# Patient Record
Sex: FEMALE | Race: WHITE | NOT HISPANIC OR LATINO | Employment: FULL TIME | URBAN - METROPOLITAN AREA
[De-identification: names, ages, dates, MRNs, and addresses within clinical notes are randomized per-mention and may not be internally consistent; named-entity substitution may affect disease eponyms.]

---

## 2020-01-20 ENCOUNTER — OFFICE VISIT (OUTPATIENT)
Dept: FAMILY MEDICINE CLINIC | Facility: CLINIC | Age: 40
End: 2020-01-20

## 2020-01-20 VITALS
HEIGHT: 61 IN | HEART RATE: 60 BPM | TEMPERATURE: 96.6 F | DIASTOLIC BLOOD PRESSURE: 52 MMHG | BODY MASS INDEX: 24.92 KG/M2 | WEIGHT: 132 LBS | SYSTOLIC BLOOD PRESSURE: 98 MMHG

## 2020-01-20 DIAGNOSIS — J01.00 ACUTE NON-RECURRENT MAXILLARY SINUSITIS: ICD-10-CM

## 2020-01-20 DIAGNOSIS — F90.2 ATTENTION DEFICIT HYPERACTIVITY DISORDER (ADHD), COMBINED TYPE: ICD-10-CM

## 2020-01-20 DIAGNOSIS — J30.1 NON-SEASONAL ALLERGIC RHINITIS DUE TO POLLEN: ICD-10-CM

## 2020-01-20 DIAGNOSIS — F41.9 ANXIETY: ICD-10-CM

## 2020-01-20 DIAGNOSIS — F42.2 MIXED OBSESSIONAL THOUGHTS AND ACTS: Primary | ICD-10-CM

## 2020-01-20 PROCEDURE — 99214 OFFICE O/P EST MOD 30 MIN: CPT | Performed by: FAMILY MEDICINE

## 2020-01-20 RX ORDER — NORETHINDRONE AND ETHINYL ESTRADIOL 1 MG-35MCG
1 KIT ORAL DAILY
COMMUNITY
Start: 2020-01-06 | End: 2022-11-30

## 2020-01-20 RX ORDER — AMOXICILLIN 875 MG/1
875 TABLET, COATED ORAL 2 TIMES DAILY
Qty: 20 TABLET | Refills: 0 | Status: SHIPPED | OUTPATIENT
Start: 2020-01-20 | End: 2021-05-03

## 2020-01-20 RX ORDER — DEXTROAMPHETAMINE SACCHARATE, AMPHETAMINE ASPARTATE MONOHYDRATE, DEXTROAMPHETAMINE SULFATE AND AMPHETAMINE SULFATE 5; 5; 5; 5 MG/1; MG/1; MG/1; MG/1
1 CAPSULE, EXTENDED RELEASE ORAL 2 TIMES DAILY
COMMUNITY
Start: 2019-12-19 | End: 2020-01-20

## 2020-01-20 RX ORDER — FLUOXETINE HYDROCHLORIDE 20 MG/1
20 CAPSULE ORAL DAILY
Qty: 30 CAPSULE | Refills: 5 | Status: SHIPPED | OUTPATIENT
Start: 2020-01-20 | End: 2020-02-17 | Stop reason: SDUPTHER

## 2020-01-20 NOTE — PROGRESS NOTES
Subjective   Nely Gonzales is a 39 y.o. female.     Vitals:    01/20/20 0954   BP: 98/52   Pulse: 60   Temp: 96.6 °F (35.9 °C)        Chief Complaint   Patient presents with   • Anxiety   • Dizziness     possible sinus   • ADHD     possible change in meds ? back to vyvanse        History of Present Illness    The following portions of the patient's history were reviewed and updated as appropriate: allergies, current medications, past family history, past medical history, past social history, past surgical history and problem list.    3-month follow-up and establish care with me here at [facility], operated by [facility]  Office visit 10/2019 with Sunday Pastor at [facility].  At that time patient's Adderall was changed back to Vyvanse due to her concern about increased OCD behavior with pulling at her hair.  Unfortunately this did not improve and now she is grinding her teeth excessively.  On a good note the obsessive behavior of pulling at her hair has improved since she has a behavioral modification of a bracelet that she wears on her wrist that vibrates when she starts to do that behavior.    She also complains of dizziness x10 days.  Despite changing her Allegra to Allegra-D this is not improving.  Of note she is getting her house remodeled, so there is a lot of dust.  Denies fever, nausea, vomiting    Review of Systems   Constitutional: Negative for fever, unexpected weight gain and unexpected weight loss.   HENT: Positive for congestion.    Respiratory: Negative for cough and chest tightness.    Cardiovascular: Negative for chest pain.   Neurological: Positive for dizziness.       Objective   Physical Exam   Constitutional: She appears well-developed.   HENT:   Right Ear: External ear normal.   Left Ear: External ear normal.   Oropharynx pharynx with drainage   Neck: Normal range of motion. Neck supple. No thyromegaly present.   Cardiovascular: Normal rate, regular rhythm and normal heart sounds.   Pulmonary/Chest: Effort normal and breath  sounds normal.   Lymphadenopathy:     She has no cervical adenopathy.   Psychiatric: She has a normal mood and affect. Her behavior is normal. Judgment and thought content normal.   Nursing note and vitals reviewed.       LABS/STUDIES   -----urine tox done 3/2019 and a CBC, CMP, TSH, lipids done as well and within normal limits      Assessment/Plan   Nely was seen today for anxiety, dizziness and adhd.    Diagnoses and all orders for this visit:    Mixed obsessional thoughts and acts/OCD --- uncontrolled, will DC Lexapro and start Prozac 20 mg 1 p.o. Daily.  Patient is to also continue with her behavioral controlled therapy    Anxiety    Attention deficit hyperactivity disorder (ADHD), combined type  --- DC Adderall, start Vyvanse 20 mg p.o. twice daily.  Note this was her previous dose and tolerated twice daily.  -     lisdexamfetamine (VYVANSE) 20 MG capsule; Take 1 capsule by mouth 2 (Two) Times a Day    Acute non-recurrent maxillary sinusitis  --- Amoxil 875 mg twice daily x10 days    Non-seasonal allergic rhinitis due to pollen  --   no change with Allegra, may use Allegra-D for the next 10 days and then back to her regular Allegra    Other orders  -     FLUoxetine (PROZAC) 20 MG capsule; Take 1 capsule by mouth Daily.  -     amoxicillin (AMOXIL) 875 MG tablet; Take 1 tablet by mouth 2 (Two) Times a Day.                   Return in about 3 months (around 4/20/2020).

## 2020-02-11 RX ORDER — ESCITALOPRAM OXALATE 10 MG/1
10 TABLET ORAL DAILY
COMMUNITY
Start: 2020-01-27 | End: 2020-03-23 | Stop reason: SDUPTHER

## 2020-02-17 RX ORDER — FLUOXETINE HYDROCHLORIDE 20 MG/1
20 CAPSULE ORAL DAILY
Qty: 90 CAPSULE | Refills: 0 | Status: SHIPPED | OUTPATIENT
Start: 2020-02-17 | End: 2020-03-23

## 2020-02-20 DIAGNOSIS — F90.2 ATTENTION DEFICIT HYPERACTIVITY DISORDER (ADHD), COMBINED TYPE: ICD-10-CM

## 2020-02-22 NOTE — TELEPHONE ENCOUNTER
Let patient know that I did make her requested change . For the future, it may take every bit of 3 days to get controlled meds refilled ....will discuss more at her appt

## 2020-03-22 DIAGNOSIS — F90.2 ATTENTION DEFICIT HYPERACTIVITY DISORDER (ADHD), COMBINED TYPE: ICD-10-CM

## 2020-03-23 DIAGNOSIS — F90.2 ATTENTION DEFICIT HYPERACTIVITY DISORDER (ADHD), COMBINED TYPE: ICD-10-CM

## 2020-03-23 RX ORDER — ESCITALOPRAM OXALATE 10 MG/1
10 TABLET ORAL DAILY
Qty: 90 TABLET | Refills: 1 | Status: SHIPPED | OUTPATIENT
Start: 2020-03-23 | End: 2020-04-24 | Stop reason: SDUPTHER

## 2020-04-24 ENCOUNTER — TELEMEDICINE (OUTPATIENT)
Dept: FAMILY MEDICINE CLINIC | Facility: CLINIC | Age: 40
End: 2020-04-24

## 2020-04-24 VITALS — BODY MASS INDEX: 24.92 KG/M2 | HEIGHT: 61 IN | WEIGHT: 132 LBS

## 2020-04-24 DIAGNOSIS — F41.9 ANXIETY: ICD-10-CM

## 2020-04-24 DIAGNOSIS — F90.2 ATTENTION DEFICIT HYPERACTIVITY DISORDER (ADHD), COMBINED TYPE: ICD-10-CM

## 2020-04-24 DIAGNOSIS — F42.2 MIXED OBSESSIONAL THOUGHTS AND ACTS: Primary | ICD-10-CM

## 2020-04-24 PROCEDURE — 99214 OFFICE O/P EST MOD 30 MIN: CPT | Performed by: FAMILY MEDICINE

## 2020-04-24 RX ORDER — ESCITALOPRAM OXALATE 10 MG/1
20 TABLET ORAL DAILY
Qty: 90 TABLET | Refills: 1 | Status: SHIPPED | OUTPATIENT
Start: 2020-04-24 | End: 2020-04-28

## 2020-04-24 NOTE — PATIENT INSTRUCTIONS
Increase Lexapro to 20 mg a day otherwise no changes  Recommend low fat/low calorie diet and exercise greater than 150 minutes of cardio per week.   Continue current treatment plan.

## 2020-04-24 NOTE — PROGRESS NOTES
Subjective   Nely Gonzales is a 39 y.o. female.     There were no vitals filed for this visit.     Chief Complaint   Patient presents with   • mixed obsessional thoughts and acts     Patient is f/u on medication         History of Present Illness    This 3-month follow-up was converted to a video visit in accordance with [facility] recommendations and guidelines given the current coronavirus pandemic    3-month follow-up on ADHD, anxiety, OCD behaviors    Patient's ADHD has been well controlled with Vyvanse 30 mg twice daily.  Gopi reviewed shows this was last filled March 23, 2020 for quantity of 60.  No aberrant behaviors.  Needs refill today    Patient's chronic anxiety has been in fair control with Lexapro 10 mg daily even despite current pandemic.  However, her OCD behavior with picking and pulling at her hair unfortunately has not resolved.  Last office visit we tried changing the Lexapro to Prozac.  Yet, unfortunately the Prozac 20 mg made things worse, such as irritability, insomnia.  Thus, Nely went ahead and stopped the Prozac and changed back to her Lexapro 10 mg.  The plan was to start some behavioral cognitive therapy as well.  But, this did not happen due to the current global pandemic.  She does try to limit the opportunities for this behavior by keeping her hair in a bun and pulled up at all times.  She currently is working at home.  Her father does help her with her son/Blaise who has autism.      The following portions of the patient's history were reviewed and updated as appropriate: allergies, current medications, past family history, past medical history, past social history, past surgical history and problem list.    Review of Systems   Constitutional: Negative for fatigue, unexpected weight gain and unexpected weight loss.   Cardiovascular: Negative for chest pain and palpitations.   Psychiatric/Behavioral: Positive for behavioral problems and stress. Negative for sleep disturbance. The  patient is nervous/anxious.        Objective   Physical Exam   Constitutional: She is oriented to person, place, and time. She appears well-developed. No distress.   HENT:   Head: Normocephalic and atraumatic.   Pulmonary/Chest: Effort normal.   Neurological: She is alert and oriented to person, place, and time.   Psychiatric: She has a normal mood and affect. Her behavior is normal. Judgment and thought content normal.   Nursing note and vitals reviewed.       LABS/STUDIES --last labs approximately summer 2019 within normal limits    Procedures     Assessment/Plan   Nely was seen today for mixed obsessional thoughts and acts.    Diagnoses and all orders for this visit:    Mixed obsessional thoughts and acts --uncontrolled.  At this time will try to increase Lexapro from 10 mg to 20 mg 1 p.o. daily.  Have discussed with patient other options such as the possibility of clonidine which may be used for tic disorder.    Attention deficit hyperactivity disorder (ADHD), combined type --stable.  Gopi, contract, tox all up-to-date.  Will need to update urine tox for compliance at next office visit.  Will refill Vyvanse 30 mg 1 p.o. twice daily.  -     lisdexamfetamine (VYVANSE) 30 MG capsule; Take 1 capsule by mouth 2 (Two) Times a Day    Anxiety --uncontrolled.  Will increase Lexapro to 20 mg 1 p.o. daily.  If this is ineffective may consider adding BuSpar?    Other orders  -     escitalopram (LEXAPRO) 10 MG tablet; Take 2 tablets by mouth Daily.      Video visit time 25 minutes           Return in about 3 months (around 7/24/2020) for Annual physical.     This was an audio and video enabled telemedicine encounter.

## 2020-04-28 RX ORDER — ESCITALOPRAM OXALATE 20 MG/1
20 TABLET ORAL DAILY
Qty: 90 TABLET | Refills: 3 | Status: SHIPPED | OUTPATIENT
Start: 2020-04-28 | End: 2020-11-11

## 2020-06-24 DIAGNOSIS — F90.2 ATTENTION DEFICIT HYPERACTIVITY DISORDER (ADHD), COMBINED TYPE: ICD-10-CM

## 2020-07-10 ENCOUNTER — OFFICE VISIT (OUTPATIENT)
Dept: FAMILY MEDICINE CLINIC | Facility: CLINIC | Age: 40
End: 2020-07-10

## 2020-07-10 VITALS
HEART RATE: 50 BPM | TEMPERATURE: 98.3 F | BODY MASS INDEX: 26.47 KG/M2 | OXYGEN SATURATION: 98 % | SYSTOLIC BLOOD PRESSURE: 80 MMHG | WEIGHT: 140.2 LBS | HEIGHT: 61 IN | DIASTOLIC BLOOD PRESSURE: 30 MMHG

## 2020-07-10 DIAGNOSIS — F90.2 ATTENTION DEFICIT HYPERACTIVITY DISORDER (ADHD), COMBINED TYPE: ICD-10-CM

## 2020-07-10 DIAGNOSIS — F41.9 ANXIETY: ICD-10-CM

## 2020-07-10 DIAGNOSIS — Z00.00 WELLNESS EXAMINATION: Primary | ICD-10-CM

## 2020-07-10 DIAGNOSIS — R00.1 BRADYCARDIA: ICD-10-CM

## 2020-07-10 DIAGNOSIS — H81.10 BENIGN PAROXYSMAL POSITIONAL VERTIGO, UNSPECIFIED LATERALITY: ICD-10-CM

## 2020-07-10 DIAGNOSIS — I49.3 PVC (PREMATURE VENTRICULAR CONTRACTION): ICD-10-CM

## 2020-07-10 DIAGNOSIS — R42 DIZZINESS: ICD-10-CM

## 2020-07-10 PROCEDURE — 99396 PREV VISIT EST AGE 40-64: CPT | Performed by: FAMILY MEDICINE

## 2020-07-10 PROCEDURE — 99214 OFFICE O/P EST MOD 30 MIN: CPT | Performed by: FAMILY MEDICINE

## 2020-07-10 PROCEDURE — 93000 ELECTROCARDIOGRAM COMPLETE: CPT | Performed by: FAMILY MEDICINE

## 2020-07-10 NOTE — PROGRESS NOTES
Subjective   Nely Gonzales is a 40 y.o. female.     Vitals:    07/10/20 0920   BP: (!) 80/30   Pulse: 50   Temp: 98.3 °F (36.8 °C)   SpO2: 98%        Chief Complaint   Patient presents with   • Annual Exam     biometric screening form filled out and fasting labs   • Dizziness   • ADD   • Anxiety        History of Present Illness    Presents for annual wellness/biometric screen and 3-month follow-up for ADD, anxiety, and new complaints of dizziness    Last office visit via telehealth.  Lexapro was increased from 10 mg to 20 mg to try to help with OCD behaviors/pulling at hair.  However, she states this did not help and what has helped her the most with some cognitive behavior training.  Thus, she recently went back to her 10 mg dose of Lexapro.  She is tolerating this without side effects.    Chronic ADHD has been stable with long-term Vyvanse 30 mg twice daily.  She has been on this regimen for many years.  Tolerates medication without side effects.  Gopi reviewed.  Last urine tox screen for compliance March 2019.    Complains of dizziness over the last 3 months.  She states this only occurs when she changes positions, especially rolling over in bed.  Denies chest pain, shortness of air, chest palpitations.  Has always had a low blood pressure, and low resting heart rate.  She believes she was diagnosed with some type of arrhythmia?  Many many years ago.  No recent illness, no fever.  Admits to excessive caffeine intake    Routine health screening/testing:  Has GYN doc, Pap up-to-date, plans to begin mammogram testing this year  Last lipids March 2019, within normal limits  Last glucose March 2019, within normal limits  Last tetanus within 10 years  No hepatitis C antibody screen  Maintains a healthy well-balanced diet and regular cardio exercise  Non-smoker, social alcohol  Family history negative for colon cancer, premature coronary artery disease      The following portions of the patient's history were  reviewed and updated as appropriate: allergies, current medications, past family history, past medical history, past social history, past surgical history and problem list.    Review of Systems   Constitutional: Negative for fatigue, fever, unexpected weight gain and unexpected weight loss.   Respiratory: Negative for shortness of breath.    Cardiovascular: Negative for chest pain, palpitations and leg swelling.   Neurological: Positive for dizziness. Negative for light-headedness and headache.   Psychiatric/Behavioral: Positive for decreased concentration and stress. Negative for self-injury, sleep disturbance, negative for hyperactivity and depressed mood. The patient is not nervous/anxious.        Objective   Physical Exam   Constitutional: She appears well-developed.   HENT:   Head: Normocephalic and atraumatic.   Right Ear: External ear normal.   Left Ear: External ear normal.   Eyes: Pupils are equal, round, and reactive to light. Conjunctivae are normal.   Neck: Normal range of motion. Neck supple. No thyromegaly present.   Cardiovascular: Normal rate and normal heart sounds.   No murmur heard.  Irregular to auscultation, PVC versus PAC?   Pulmonary/Chest: Effort normal and breath sounds normal.   Abdominal: Soft. Bowel sounds are normal. She exhibits no distension. There is no hepatosplenomegaly. There is no tenderness.   Musculoskeletal: She exhibits no edema.   Lymphadenopathy:     She has no cervical adenopathy.   Psychiatric: She has a normal mood and affect. Her behavior is normal. Judgment and thought content normal.   Nursing note and vitals reviewed.       LABS/STUDIES last labs March 2019 all within normal limits      ECG 12 Lead  Date/Time: 7/10/2020 4:59 PM  Performed by: Brynn Dong MD  Authorized by: Brynn Dong MD   Comparison: not compared with previous ECG   Previous ECG: no previous ECG available  Rhythm: sinus arrhythmia  Ectopy: multifocal PVCs and bigeminy  Rate:  normal  Conduction: conduction normal  ST Segments: ST segments normal  T inversion: III  QRS axis: normal  Other: no other findings  Other findings: early repolarization    Clinical impression: non-specific ECG  Comments: PVCs noted, one episode of bigeminy  Asymptomatic  Lots of caffeine  Previous history of PVCs             Assessment/Plan   Nely was seen today for annual exam, dizziness, add and anxiety.    Diagnoses and all orders for this visit:    Wellness examination within normal limits.  Has GYN doctor.  All screening up-to-date except for needs CBC, CMP, hepatitis C antibody screen, and lipid profile.  Otherwise, recommend significant decrease in caffeine intake and continue with healthy well-balanced diet and regular cardio exercise greater than 150 minutes weekly.  Will complete biometric forms once labs are resulted  -     CBC & Differential  -     Comprehensive Metabolic Panel  -     Hepatitis C Antibody  -     Lipid Panel With LDL / HDL Ratio    Dizziness--new diagnosis.  Suspect BPV.  Will check CBC and electrolytes.  -     CBC & Differential    Benign paroxysmal positional vertigo, unspecified laterality new diagnosis/uncontrolled.  Patient wishes to begin vestibular rehab, order placed    PVC (premature ventricular contraction) uncontrolled.  Likely secondary to significant caffeine take.  Have discussed with patient to decrease caffeine intake.  Will check CMP, magnesium, and TSH to rule out other etiologies.  Asymptomatic  -     Comprehensive Metabolic Panel  -     Magnesium  -     TSH    Bradycardia asymptomatic    Anxiety controlled.  No change with Lexapro 10 mg 1 p.o. daily, will refill upon request    Attention deficit hyperactivity disorder (ADHD), combined type stable.  Gopi reviewed.  No change with Vyvanse.  Next refill for Vyvanse 30 mg 1 p.o. twice daily due July 27, 2020.  Will update urine tox screen for compliance next visit                 Return in about 3 months (around  10/10/2020) for Recheck.

## 2020-07-10 NOTE — PATIENT INSTRUCTIONS
Decrease caffeine  Start vestibular rehab  Recommend low fat/low calorie diet and exercise greater than 150 minutes of cardio per week.   Continue current treatment plan.

## 2020-07-11 LAB
ALBUMIN SERPL-MCNC: 4.6 G/DL (ref 3.5–5.2)
ALBUMIN/GLOB SERPL: 2.3 G/DL
ALP SERPL-CCNC: 24 U/L (ref 39–117)
ALT SERPL-CCNC: 16 U/L (ref 1–33)
AST SERPL-CCNC: 18 U/L (ref 1–32)
BASOPHILS # BLD AUTO: 0.03 10*3/MM3 (ref 0–0.2)
BASOPHILS NFR BLD AUTO: 0.5 % (ref 0–1.5)
BILIRUB SERPL-MCNC: 0.4 MG/DL (ref 0–1.2)
BUN SERPL-MCNC: 12 MG/DL (ref 6–20)
BUN/CREAT SERPL: 15 (ref 7–25)
CALCIUM SERPL-MCNC: 8.6 MG/DL (ref 8.6–10.5)
CHLORIDE SERPL-SCNC: 103 MMOL/L (ref 98–107)
CHOLEST SERPL-MCNC: 198 MG/DL (ref 0–200)
CO2 SERPL-SCNC: 25 MMOL/L (ref 22–29)
CREAT SERPL-MCNC: 0.8 MG/DL (ref 0.57–1)
EOSINOPHIL # BLD AUTO: 0.13 10*3/MM3 (ref 0–0.4)
EOSINOPHIL NFR BLD AUTO: 2.1 % (ref 0.3–6.2)
ERYTHROCYTE [DISTWIDTH] IN BLOOD BY AUTOMATED COUNT: 11.3 % (ref 12.3–15.4)
GLOBULIN SER CALC-MCNC: 2 GM/DL
GLUCOSE SERPL-MCNC: 83 MG/DL (ref 65–99)
HCT VFR BLD AUTO: 39.6 % (ref 34–46.6)
HCV AB S/CO SERPL IA: <0.1 S/CO RATIO (ref 0–0.9)
HDLC SERPL-MCNC: 66 MG/DL (ref 40–60)
HGB BLD-MCNC: 13.1 G/DL (ref 12–15.9)
IMM GRANULOCYTES # BLD AUTO: 0.01 10*3/MM3 (ref 0–0.05)
IMM GRANULOCYTES NFR BLD AUTO: 0.2 % (ref 0–0.5)
LDLC SERPL CALC-MCNC: 121 MG/DL (ref 0–100)
LDLC/HDLC SERPL: 1.84 {RATIO}
LYMPHOCYTES # BLD AUTO: 2.08 10*3/MM3 (ref 0.7–3.1)
LYMPHOCYTES NFR BLD AUTO: 33.9 % (ref 19.6–45.3)
MAGNESIUM SERPL-MCNC: 2.1 MG/DL (ref 1.6–2.6)
MCH RBC QN AUTO: 29.8 PG (ref 26.6–33)
MCHC RBC AUTO-ENTMCNC: 33.1 G/DL (ref 31.5–35.7)
MCV RBC AUTO: 90.2 FL (ref 79–97)
MONOCYTES # BLD AUTO: 0.35 10*3/MM3 (ref 0.1–0.9)
MONOCYTES NFR BLD AUTO: 5.7 % (ref 5–12)
NEUTROPHILS # BLD AUTO: 3.53 10*3/MM3 (ref 1.7–7)
NEUTROPHILS NFR BLD AUTO: 57.6 % (ref 42.7–76)
NRBC BLD AUTO-RTO: 0 /100 WBC (ref 0–0.2)
PLATELET # BLD AUTO: 353 10*3/MM3 (ref 140–450)
POTASSIUM SERPL-SCNC: 3.9 MMOL/L (ref 3.5–5.2)
PROT SERPL-MCNC: 6.6 G/DL (ref 6–8.5)
RBC # BLD AUTO: 4.39 10*6/MM3 (ref 3.77–5.28)
SODIUM SERPL-SCNC: 138 MMOL/L (ref 136–145)
TRIGL SERPL-MCNC: 53 MG/DL (ref 0–150)
TSH SERPL DL<=0.005 MIU/L-ACNC: 1.65 UIU/ML (ref 0.27–4.2)
VLDLC SERPL CALC-MCNC: 10.6 MG/DL
WBC # BLD AUTO: 6.13 10*3/MM3 (ref 3.4–10.8)

## 2020-07-29 DIAGNOSIS — F90.2 ATTENTION DEFICIT HYPERACTIVITY DISORDER (ADHD), COMBINED TYPE: ICD-10-CM

## 2020-08-04 ENCOUNTER — HOSPITAL ENCOUNTER (OUTPATIENT)
Dept: PHYSICAL THERAPY | Facility: HOSPITAL | Age: 40
Setting detail: THERAPIES SERIES
Discharge: HOME | End: 2020-08-04

## 2020-08-04 DIAGNOSIS — H81.11 BENIGN PAROXYSMAL POSITIONAL VERTIGO OF RIGHT EAR: ICD-10-CM

## 2020-08-04 DIAGNOSIS — R42 DIZZINESS: Primary | ICD-10-CM

## 2020-08-04 PROCEDURE — 95992 CANALITH REPOSITIONING PROC: CPT | Performed by: PHYSICAL THERAPIST

## 2020-08-04 PROCEDURE — 97161 PT EVAL LOW COMPLEX 20 MIN: CPT | Performed by: PHYSICAL THERAPIST

## 2020-08-04 NOTE — THERAPY EVALUATION
Outpatient Physical Therapy Ortho Initial Evaluation  [facility]     Patient Name: Nely Gonzales  : 1980  MRN: [id number]  Today's Date: 2020      Visit Date: 2020    Patient Active Problem List   Diagnosis   • Anxiety   • Mixed obsessional thoughts and acts   • Attention deficit hyperactivity disorder (ADHD), combined type   • Non-seasonal allergic rhinitis due to pollen   • Acute non-recurrent maxillary sinusitis   • Wellness examination   • Dizziness   • Bradycardia   • PVC (premature ventricular contraction)   • Benign paroxysmal positional vertigo        Past Medical History:   Diagnosis Date   • ADHD (attention deficit hyperactivity disorder)    • Allergic    • Anxiety    • Cancer (CMS/HCC)    • Kidney stone         Past Surgical History:   Procedure Laterality Date   • BREAST SURGERY      augmentation   • TONSILLECTOMY         Visit Dx:     ICD-10-CM ICD-9-CM   1. Dizziness R42 780.4   2. Benign paroxysmal positional vertigo of right ear H81.11 386.11         Patient History     Row Name 20 1300             History    Chief Complaint  Dizziness  -GR      Date Current Problem(s) Began  20  -GR      Brief Description of Current Complaint  Chronic history of intermittent vertigo. Current episode over the last few months typically with laying down and turning head to the R and sitting up.  She denies nausea, tinnitus, change to vision/hearing.   She reports a sensation of the room spinning.    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  No  -GR         Fall Risk Assessment    Any falls in the past year:  No  -GR         Services    Do you plan to receive Home Health services in the near future  No  -GR         Daily Activities    How does patient learn  best?  Listening;Reading;Demonstration  -GR      Pt Participated in POC and Goals  Yes  -GR         Safety    Are you being hurt, hit, or frightened by anyone at home or in your life?  No  -GR      Are you being neglected by a caregiver  No  -GR        User Key  (r) = Recorded By, (t) = Taken By, (c) = Cosigned By    Initials Name Provider Type    Seun Palmer, PT Physical Therapist                    Vestibular Eval     Row Name 08/04/20 1300             Occulomotor Exam Fixation Present    Occular ROM  Normal  -GR      Smooth Pursuit  Normal  -GR      Saccades  Intact  -GR      Convergence  Abnormal  -GR         Vestibulo-Occular Reflex (VOR)    VOR to Fast Head Movement/Head Thrust Test  Positive right;Positive left  -GR      VOR Cancellation  Normal  -GR         Positional Testing    Positional Testing  Without infrared goggles  -GR      Vertebrobasilar Artery Screen - Right  Negative  -GR      Vertebrobasilar Artery Screen - Left  Negative  -GR      Nickie-Hallpike Right  Upbeat, right rotatory nystagmus  -GR      Nickie-Hallpike Right Onset Time   2  -GR      [place]-Hallpike Right Duration Time   10  -GR      Nickie-Hallpike Left  No nystagmus  -GR      Horizontal Roll Test Right  No nystagmus  -GR      Horizontal Roll Test Left  No nystagmus  -GR        User Key  (r) = Recorded By, (t) = Taken By, (c) = Cosigned By    Initials Name Provider Type    Seun Palmer, PT Physical Therapist            Therapy Education  Education Details: Role of outpatient PT, vestibular anatomy, differential diagnosis, tri-part balance system, expectations, post-maneuver recommendations, follow up plans.  Given: Symptoms/condition management  Program: New  How Provided: Verbal  Provided to: Patient  Level of Understanding: Teach back education performed, Verbalized     PT OP Goals     Row Name 08/04/20 1400          PT Short Term Goals    STG Date to Achieve  08/19/20  -GR     STG 1  Patient will be independent with static  habituation PRN for in home safety.  -GR     STG 1 Progress  New  -GR     STG 2  Patient will present negative for right BPPV of the posterior canal.  -GR     STG 2 Progress  New  -GR        Long Term Goals    LTG Date to Achieve  09/18/20  -GR     LTG 1  Patient will be independent with dynamic adaptation techniques for community reintegration.  -GR     LTG 1 Progress  New  -GR     LTG 2  Patient will present negative for BPPV of all canals.  -GR     LTG 2 Progress  New  -GR     LTG 3  Patient will score </= 4/100 on the dizziness handicap inventory to indicate improved perceived positional tolerance.  -GR     LTG 3 Progress  New  -GR        Time Calculation    PT Goal Re-Cert Due Date  11/02/20  -GR       User Key  (r) = Recorded By, (t) = Taken By, (c) = Cosigned By    Initials Name Provider Type    Seun Palmer, PT Physical Therapist          PT Assessment/Plan     Row Name 08/04/20 1426          PT Assessment    Functional Limitations  Performance in leisure activities;Performance in self-care ADL  -GR     Impairments  Balance  -GR     Assessment Comments  40 y.o. female referred to outpatient physical therapy for vestibular evaluation.  Signs and symptoms are consistent with R posterior canalithiasis (BPPV).  Oculomotor exam is unremarkable; head thrust was positive bilaterally. Pertinent comorbidities and personal factors that may affect progress include, but are not limited to, previous vertigo, L eye neglect.  This condition is stable. Canalith repositioning was performed to correct the condition with a positive retest noted. Recommend skilled PT to address functional deficits PRN. Thank you for this referral.  -GR     Please refer to paper survey for additional self-reported information  Yes  -GR     Rehab Potential  Excellent  -GR     Patient/caregiver participated in establishment of treatment plan and goals  Yes  -GR     Patient would benefit from skilled therapy intervention  Yes  -GR        PT  Plan    PT Frequency  1x/week  -GR     Predicted Duration of Therapy Intervention (Therapy Eval)  4 visits  -GR     Planned CPT's?  PT EVAL LOW COMPLEXITY: 63748;PT RE-EVAL: 30359;PT THER ACT EA 15 MIN: 31277;PT THER PROC EA 15 MIN: 96437;PT NEUROMUSC RE-EDUCATION EA 15 MIN: 76422;PT GAIT TRAINING EA 15 MIN: 26860;PT CANALITH REPOSITIONIN  -GR     Physical Therapy Interventions (Optional Details)  balance training;home exercise program;vestibular training  -GR     PT Plan Comments  Recheck for BPPV and tx as appropriate.  -GR       User Key  (r) = Recorded By, (t) = Taken By, (c) = Cosigned By    Initials Name Provider Type    Seun Palmer, PT Physical Therapist            OP Exercises     Row Name 20 1400             Exercise 1    Exercise Name 1  CRM EPLEY R  -GR      Cueing 1  Demo  -GR      Sets 1  1  -GR      Reps 1  1  -GR      Additional Comments  retest positive  -GR        User Key  (r) = Recorded By, (t) = Taken By, (c) = Cosigned By    Initials Name Provider Type    Seun Palmer, PT Physical Therapist                        Outcome Measure Options: Dizziness Handicap Inventory()         Time Calculation:     Start Time: 1340  Stop Time: 1420  Time Calculation (min): 40 min  Total Timed Code Minutes- PT: 0 minute(s)     Therapy Charges for Today     Code Description Service Date Service Provider Modifiers Qty    07567961809 HC PT CANALITH REPOSITIONING PER DAY 2020 Seun Solis, PT GP 1    77562682859 HC PT EVAL LOW COMPLEXITY 2 2020 Seun Solis, PT GP 1          PT G-Codes  Outcome Measure Options: Dizziness Handicap Inventory()         Seun Solis PT  2020

## 2020-08-07 ENCOUNTER — HOSPITAL ENCOUNTER (OUTPATIENT)
Dept: PHYSICAL THERAPY | Facility: HOSPITAL | Age: 40
Setting detail: THERAPIES SERIES
Discharge: HOME | End: 2020-08-07

## 2020-08-07 PROCEDURE — 97530 THERAPEUTIC ACTIVITIES: CPT | Performed by: PHYSICAL THERAPIST

## 2020-08-07 NOTE — THERAPY TREATMENT NOTE
Outpatient Physical Therapy Vestibular Treatment Note  [facility]     Patient Name: Nely Gonzales  : 1980  MRN: [id number]  Today's Date: 2020      Visit Date: 2020    Visit Dx:   No diagnosis found.    Patient Active Problem List   Diagnosis   • Anxiety   • Mixed obsessional thoughts and acts   • Attention deficit hyperactivity disorder (ADHD), combined type   • Non-seasonal allergic rhinitis due to pollen   • Acute non-recurrent maxillary sinusitis   • Wellness examination   • Dizziness   • Bradycardia   • PVC (premature ventricular contraction)   • Benign paroxysmal positional vertigo       Vestibular Eval     Row Name 20 1500             Positional Testing    Positional Testing  Without infrared goggles  -GR      Vertebrobasilar Artery Screen - Right  Negative  -GR      Vertebrobasilar Artery Screen - Left  Negative  -GR      [place]-Hallpike Right  No nystagmus  -GR      [place]-Hallpike Left  No nystagmus  -GR      Horizontal Roll Test Right  No nystagmus  -GR      Horizontal Roll Test Left  No nystagmus  -GR        User Key  (r) = Recorded By, (t) = Taken By, (c) = Cosigned By    Initials Name Provider Type    Seun Palmer, PT Physical Therapist                      PT Assessment/Plan     Row Name 20 1534          PT Assessment    Assessment Comments  Patient returns for 1st follow up. She presents negative for BPPV all canals and was taught home epley should her symptoms return. Will hold on formal treatment at this time and return PRN.  -GR        PT Plan    PT Plan Comments  Return PRN pending symptoms.  -GR       User Key  (r) = Recorded By, (t) = Taken By, (c) = Cosigned By    Initials Name Provider Type    Seun Palmer, PT Physical Therapist             OP Exercises     Row Name 20 1500             Subjective Comments    Subjective Comments  Feels improved.  -GR         Subjective Pain    Able to rate subjective pain?  yes  -GR      Pre-Treatment Pain  Level  0  -GR         Total Minutes    53640 - PT Therapeutic Activity Minutes  15  -GR         Exercise 1    Exercise Name 1  Taught home epley maneuver  -GR      Time 1  15  -GR        User Key  (r) = Recorded By, (t) = Taken By, (c) = Cosigned By    Initials Name Provider Type    Seun Palmer, PT Physical Therapist                      PT OP Goals     Row Name 08/07/20 1500          PT Short Term Goals    STG Date to Achieve  08/19/20  -GR     STG 1  Patient will be independent with static habituation PRN for in home safety.  -GR     STG 1 Progress  Met  -GR     STG 2  Patient will present negative for right BPPV of the posterior canal.  -GR     STG 2 Progress  Met  -GR        Long Term Goals    LTG Date to Achieve  09/18/20  -GR     LTG 1  Patient will be independent with dynamic adaptation techniques for community reintegration.  -GR     LTG 1 Progress  Ongoing  -GR     LTG 2  Patient will present negative for BPPV of all canals.  -GR     LTG 2 Progress  Met  -GR     LTG 3  Patient will score </= 4/100 on the dizziness handicap inventory to indicate improved perceived positional tolerance.  -GR     LTG 3 Progress  Ongoing  -GR       User Key  (r) = Recorded By, (t) = Taken By, (c) = Cosigned By    Initials Name Provider Type    Seun Palmer PT Physical Therapist          Therapy Education  Education Details: home epley              Time Calculation:   Start Time: 1435  Stop Time: 1455  Time Calculation (min): 20 min  Total Timed Code Minutes- PT: 15 minute(s)   Therapy Charges for Today     Code Description Service Date Service Provider Modifiers Qty    25120730430  PT THERAPEUTIC ACT EA 15 MIN 8/7/2020 Seun Solis, PT GP 1                   Seun Solis, PT  8/7/2020

## 2020-09-01 DIAGNOSIS — F90.2 ATTENTION DEFICIT HYPERACTIVITY DISORDER (ADHD), COMBINED TYPE: ICD-10-CM

## 2020-09-11 RX ORDER — ESCITALOPRAM OXALATE 10 MG/1
TABLET ORAL
Qty: 90 TABLET | Refills: 1 | Status: SHIPPED | OUTPATIENT
Start: 2020-09-11 | End: 2020-11-11 | Stop reason: SDUPTHER

## 2020-10-04 DIAGNOSIS — F90.2 ATTENTION DEFICIT HYPERACTIVITY DISORDER (ADHD), COMBINED TYPE: ICD-10-CM

## 2020-11-02 DIAGNOSIS — F90.2 ATTENTION DEFICIT HYPERACTIVITY DISORDER (ADHD), COMBINED TYPE: ICD-10-CM

## 2020-11-06 DIAGNOSIS — F90.2 ATTENTION DEFICIT HYPERACTIVITY DISORDER (ADHD), COMBINED TYPE: ICD-10-CM

## 2020-11-06 NOTE — TELEPHONE ENCOUNTER
PATIENT IS CALLING IN ABOUT THIS REFILL REQUEST. SHE STATES THAT SHE DID HAVE AN APPT SET WITH DOCTOR ON 10/09/2020 AND OFFICE CALLED HER TO RESCHEDULE DO TO DOCTOR NOT BEING IN OFFICE AND THEY COULD NOT RESCHEDULE UNTIL JAN AND SHE NEEDS HER MEDICATION.    SHE IS WILLING TO DO TELELHEALTH OR WHATEVER SHE NEEDS TO DO SO SHE CAN GET HER REFILL.      PLEASE ADVISE    CALLBACK NUMBER IS  [phone]

## 2020-11-11 ENCOUNTER — OFFICE VISIT (OUTPATIENT)
Dept: FAMILY MEDICINE CLINIC | Facility: CLINIC | Age: 40
End: 2020-11-11

## 2020-11-11 VITALS — TEMPERATURE: 98.2 F | HEIGHT: 61 IN | BODY MASS INDEX: 26.43 KG/M2 | WEIGHT: 140 LBS

## 2020-11-11 DIAGNOSIS — F41.9 ANXIETY: ICD-10-CM

## 2020-11-11 DIAGNOSIS — F90.2 ATTENTION DEFICIT HYPERACTIVITY DISORDER (ADHD), COMBINED TYPE: Primary | ICD-10-CM

## 2020-11-11 DIAGNOSIS — H81.11 BENIGN PAROXYSMAL POSITIONAL VERTIGO OF RIGHT EAR: ICD-10-CM

## 2020-11-11 PROBLEM — J01.00 ACUTE NON-RECURRENT MAXILLARY SINUSITIS: Status: RESOLVED | Noted: 2020-01-20 | Resolved: 2020-11-11

## 2020-11-11 PROBLEM — R42 DIZZINESS: Status: RESOLVED | Noted: 2020-07-10 | Resolved: 2020-11-11

## 2020-11-11 PROBLEM — Z00.00 WELLNESS EXAMINATION: Status: RESOLVED | Noted: 2020-07-10 | Resolved: 2020-11-11

## 2020-11-11 PROCEDURE — 99443 PR PHYS/QHP TELEPHONE EVALUATION 21-30 MIN: CPT | Performed by: FAMILY MEDICINE

## 2020-11-11 RX ORDER — ESCITALOPRAM OXALATE 10 MG/1
10 TABLET ORAL DAILY
Qty: 90 TABLET | Refills: 1 | Status: SHIPPED | OUTPATIENT
Start: 2020-11-11 | End: 2021-02-03 | Stop reason: SDUPTHER

## 2020-11-11 NOTE — PROGRESS NOTES
You have chosen to receive care through a telephone visit today. Do you consent to use a telephone visit for your medical care today? Yes     Pt is a pleasant 40 y.o. YO female here for televisit for 3-month follow-up for ADHD, AJ, and BPV    LOV with me in July for wellness exam, med refills, and complaints of dizziness.  Labs were all within normal limits, no changes were made to medications, and she was referred to vestibular rehab for the diagnosis of BPV.  On a good note, she is feeling much better overall.  She states the vestibular rehab therapy was successful and her dizziness has resolved.  For the most part she is doing okay all considering.  However, she is a little frustrated with the [facility] scheduling system because her appointment for October was canceled and she was not given a follow-up appointment until January.  Thus, I had to work her into the schedule today for medication refills.  And, unable to come to the office for an actual visit due to her work schedule.    ADHD--has been well controlled with Vyvanse 30 mg twice daily.  She is compliant with this dosing regimen and tolerates medication without side effects.  Gopi reviewed by me today shows Vyvanse a quantity of 60 last refilled 10/7/2020.  Needs refill today    AJ--has been well controlled with Lexapro 10 mg daily.  She would like a new refill for the 10 mg dose as she has been cutting a 20 mg in half.  Sleeping well, anxiety in good control.    Labs in July 2020--CBC, CMP, lipids, TSH, magnesium all within normal limits    Medication list reviewed    Diagnoses and all orders for this visit:    1. Attention deficit hyperactivity disorder (ADHD), combined type (Primary) --stable.  Gopi reviewed and appropriate.  Will need update urine tox screen at next visit, unable to do so today given this being a telephone visit.  No change with medication  Refill Vyvanse 30 mg as directed below, quantity 60 per 30 days.  -     lisdexamfetamine  (VYVANSE) 30 MG capsule; Take 1 capsule by mouth 2 (Two) Times a Day  Dispense: 60 capsule; Refill: 0    2. Anxiety --stable.  No change in medication.  Continue/refill Lexapro 10 mg 1 p.o. daily    3. Benign paroxysmal positional vertigo of right ear --resolved.  Counseled patient regarding natural history and precautions.    Other orders  -     escitalopram (LEXAPRO) 10 MG tablet; Take 1 tablet by mouth Daily.  Dispense: 90 tablet; Refill: 1         Instructed patient to call the office if symptoms are not improving.  Warnings to go to emergency room given.    This visit has been rescheduled as a phone visit to comply with patient safety concerns in accordance with CDC recommendations. Total time of discussion was 21 minutes.  Appointment start 944 am

## 2020-12-09 DIAGNOSIS — F90.2 ATTENTION DEFICIT HYPERACTIVITY DISORDER (ADHD), COMBINED TYPE: ICD-10-CM

## 2021-01-08 DIAGNOSIS — F90.2 ATTENTION DEFICIT HYPERACTIVITY DISORDER (ADHD), COMBINED TYPE: ICD-10-CM

## 2021-02-03 ENCOUNTER — OFFICE VISIT (OUTPATIENT)
Dept: FAMILY MEDICINE CLINIC | Facility: CLINIC | Age: 41
End: 2021-02-03

## 2021-02-03 VITALS
BODY MASS INDEX: 28.32 KG/M2 | HEIGHT: 61 IN | OXYGEN SATURATION: 98 % | TEMPERATURE: 97.5 F | HEART RATE: 88 BPM | SYSTOLIC BLOOD PRESSURE: 98 MMHG | WEIGHT: 150 LBS | DIASTOLIC BLOOD PRESSURE: 50 MMHG

## 2021-02-03 DIAGNOSIS — F41.9 ANXIETY: ICD-10-CM

## 2021-02-03 DIAGNOSIS — Z79.899 HIGH RISK MEDICATION USE: ICD-10-CM

## 2021-02-03 DIAGNOSIS — F90.2 ATTENTION DEFICIT HYPERACTIVITY DISORDER (ADHD), COMBINED TYPE: Primary | ICD-10-CM

## 2021-02-03 PROBLEM — U07.1 COVID-19 VIRUS INFECTION: Status: ACTIVE | Noted: 2021-02-03

## 2021-02-03 PROCEDURE — 99213 OFFICE O/P EST LOW 20 MIN: CPT | Performed by: FAMILY MEDICINE

## 2021-02-03 RX ORDER — ESCITALOPRAM OXALATE 10 MG/1
10 TABLET ORAL DAILY
Qty: 90 TABLET | Refills: 1 | Status: SHIPPED | OUTPATIENT
Start: 2021-02-03 | End: 2021-05-03 | Stop reason: SDUPTHER

## 2021-02-03 NOTE — PROGRESS NOTES
Subjective   Nely Gonzales is a 40 y.o. female.     Vitals:    02/03/21 1257   BP: 98/50   Pulse: 88   Temp: 97.5 °F (36.4 °C)   SpO2: 98%        Chief Complaint   Patient presents with   • ADHD     MED CHECK AND REFILLS        History of Present Illness    3-month checkup for ADHD and AJ    LOV with me in November via telemedicine for med refills only.  No changes made to medications.  Compliant with and tolerates both her Vyvanse and Lexapro.  Pretty much takes Vyvanse twice daily as she works full-time and even on the weekends due to needed projects getting done.  Did have COVID-19 over the holidays, relatively mild symptoms.  Completely resolved and back to work  Did receive her COVID-19 vaccine as she was told by [employer] if not,  she would not get paid for her furloughed time off    No complaints today  Just needs refills  Last urine tox screen for compliance was in March 2020 and consistent with prescription history.  Gopi reviewed by me today shows Vyvanse last refilled for quantity of 60 on 1/13/2020.    The following portions of the patient's history were reviewed and updated as appropriate: allergies, current medications, past family history, past medical history, past social history, past surgical history and problem list.    Review of Systems   Constitutional: Negative for fever, unexpected weight gain and unexpected weight loss.   Respiratory: Negative for cough and shortness of breath.    Cardiovascular: Negative for chest pain.       Objective   Physical Exam  Vitals signs and nursing note reviewed.   Constitutional:       Appearance: Normal appearance. She is well-developed and normal weight.   HENT:      Head: Normocephalic and atraumatic.      Nose: Nose normal.   Neck:      Musculoskeletal: Normal range of motion and neck supple.      Thyroid: No thyromegaly.   Cardiovascular:      Rate and Rhythm: Normal rate and regular rhythm.      Heart sounds: Normal heart sounds.   Pulmonary:       Effort: Pulmonary effort is normal.      Breath sounds: Normal breath sounds.   Abdominal:      General: Abdomen is flat. Bowel sounds are normal. There is no distension.      Palpations: Abdomen is soft.      Tenderness: There is no abdominal tenderness.   Musculoskeletal:      Right lower leg: No edema.      Left lower leg: No edema.   Lymphadenopathy:      Cervical: No cervical adenopathy.   Neurological:      Mental Status: She is alert.          LABS/STUDIES --labs in July 2020, within normal limits, urine tox done March 2020, appropriate    Procedures     Assessment/Plan   Diagnoses and all orders for this visit:    1. Attention deficit hyperactivity disorder (ADHD), combined type (Primary) --well-controlled.  Gopi reviewed, appropriate.  Will update urine tox screen for compliance today (takes medication on a regular daily basis)  Refill Vyvanse 30 mg 1 p.o. twice daily, next refill due 2/12/2021  -     lisdexamfetamine (VYVANSE) 30 MG capsule; Take 1 capsule by mouth 2 (Two) Times a Day  Dispense: 60 capsule; Refill: 0  -     ToxASSURE Select 13 Discrete -    2. Anxiety --stable.  No change with Lexapro 10 mg 1 p.o. daily    3. High risk medication use  -     ToxASSURE Select 13 Discrete -    Other orders  -     escitalopram (LEXAPRO) 10 MG tablet; Take 1 tablet by mouth Daily.  Dispense: 90 tablet; Refill: 1                 Wore goggles and face mask during entire visit.    Return in about 3 months (around 5/3/2021) for Recheck.

## 2021-02-08 LAB
6MAM UR QL CFM: NEGATIVE
6MAM/CREAT UR: NOT DETECTED NG/MG CREAT
7AMINOCLONAZEPAM/CREAT UR: NOT DETECTED NG/MG CREAT
A-OH ALPRAZ/CREAT UR: NOT DETECTED NG/MG CREAT
A-OH-TRIAZOLAM/CREAT UR CFM: NOT DETECTED NG/MG CREAT
ALFENTANIL/CREAT UR CFM: NOT DETECTED NG/MG CREAT
ALPHA-HYDROXYMIDAZOLAM, URINE: NOT DETECTED NG/MG CREAT
ALPRAZ/CREAT UR CFM: NOT DETECTED NG/MG CREAT
AMOBARBITAL UR QL CFM: NOT DETECTED
AMPHET/CREAT UR: 6148 NG/MG CREAT
AMPHETAMINES UR QL CFM: NORMAL
BARBITAL UR QL CFM: NOT DETECTED
BARBITURATES UR QL CFM: NEGATIVE
BENZODIAZ UR QL CFM: NEGATIVE
BUPRENORPHINE UR QL CFM: NEGATIVE
BUPRENORPHINE/CREAT UR: NOT DETECTED NG/MG CREAT
BUTABARBITAL UR QL CFM: NOT DETECTED
BUTALBITAL UR QL CFM: NOT DETECTED
BZE/CREAT UR: NOT DETECTED NG/MG CREAT
CANNABINOIDS UR QL CFM: NEGATIVE
CARBOXYTHC/CREAT UR: NOT DETECTED NG/MG CREAT
CLONAZEPAM/CREAT UR CFM: NOT DETECTED NG/MG CREAT
COCAETHYLENE/CREAT UR CFM: NOT DETECTED NG/MG CREAT
COCAINE UR QL CFM: NEGATIVE
COCAINE/CREAT UR CFM: NOT DETECTED NG/MG CREAT
CODEINE/CREAT UR: NOT DETECTED NG/MG CREAT
CREAT UR-MCNC: 29 MG/DL
DESALKYLFLURAZ/CREAT UR: NOT DETECTED NG/MG CREAT
DESMETHYLFLUNITRAZEPAM: NOT DETECTED NG/MG CREAT
DHC/CREAT UR: NOT DETECTED NG/MG CREAT
DIAZEPAM/CREAT UR: NOT DETECTED NG/MG CREAT
DRUGS UR: NORMAL
EDDP/CREAT UR: NOT DETECTED NG/MG CREAT
ETHANOL UR CFM-MCNC: NOT DETECTED G/DL
ETHANOL UR QL CFM: NEGATIVE
FENTANYL UR QL CFM: NEGATIVE
FENTANYL/CREAT UR: NOT DETECTED NG/MG CREAT
FLUNITRAZEPAM UR QL CFM: NOT DETECTED NG/MG CREAT
HYDROCODONE/CREAT UR: NOT DETECTED NG/MG CREAT
HYDROMORPHONE/CREAT UR: NOT DETECTED NG/MG CREAT
LEVEL OF DETECTION:: NORMAL
LORAZEPAM/CREAT UR: NOT DETECTED NG/MG CREAT
MDA/CREAT UR: NOT DETECTED NG/MG CREAT
MDMA/CREAT UR: NOT DETECTED NG/MG CREAT
MEPHOBARBITAL UR QL CFM: NOT DETECTED
METHADONE UR QL CFM: NEGATIVE
METHADONE/CREAT UR: NOT DETECTED NG/MG CREAT
METHAMPHET/CREAT UR: NOT DETECTED NG/MG CREAT
MIDAZOLAM/CREAT UR CFM: NOT DETECTED NG/MG CREAT
MORPHINE/CREAT UR: NOT DETECTED NG/MG CREAT
N-NORTRAMADOL/CREAT UR CFM: NOT DETECTED NG/MG CREAT
NARCOTICS UR: NEGATIVE
NORBUPRENORPHINE/CREAT UR: NOT DETECTED NG/MG CREAT
NORCODEINE/CREAT UR CFM: NOT DETECTED NG/MG CREAT
NORDIAZEPAM/CREAT UR: NOT DETECTED NG/MG CREAT
NORFENTANYL/CREAT UR: NOT DETECTED NG/MG CREAT
NORHYDROCODONE/CREAT UR: NOT DETECTED NG/MG CREAT
NORMORPHINE UR-MCNC: NOT DETECTED NG/MG CREAT
NOROXYCODONE/CREAT UR: NOT DETECTED NG/MG CREAT
NOROXYMORPHONE/CREAT UR CFM: NOT DETECTED NG/MG CREAT
O-NORTRAMADOL UR CFM-MCNC: NOT DETECTED NG/MG CREAT
OPIATES UR QL CFM: NEGATIVE
OXAZEPAM/CREAT UR: NOT DETECTED NG/MG CREAT
OXYCODONE UR QL CFM: NEGATIVE
OXYCODONE/CREAT UR: NOT DETECTED NG/MG CREAT
OXYMORPHONE/CREAT UR: NOT DETECTED NG/MG CREAT
PENTOBARB UR QL CFM: NOT DETECTED
PHENOBARB UR QL CFM: NOT DETECTED
SECOBARBITAL UR QL CFM: NOT DETECTED
SUFENTANIL/CREAT UR CFM: NOT DETECTED NG/MG CREAT
TAPENTADOL UR QL CFM: NEGATIVE
TAPENTADOL/CREAT UR: NOT DETECTED NG/MG CREAT
TEMAZEPAM/CREAT UR: NOT DETECTED NG/MG CREAT
THIOPENTAL UR QL CFM: NOT DETECTED
TRAMADOL UR QL CFM: NOT DETECTED NG/MG CREAT

## 2021-03-01 ENCOUNTER — APPOINTMENT (OUTPATIENT)
Dept: WOMENS IMAGING | Facility: HOSPITAL | Age: 41
End: 2021-03-01

## 2021-03-01 PROCEDURE — 77063 BREAST TOMOSYNTHESIS BI: CPT | Performed by: RADIOLOGY

## 2021-03-01 PROCEDURE — 77067 SCR MAMMO BI INCL CAD: CPT | Performed by: RADIOLOGY

## 2021-03-19 DIAGNOSIS — F90.2 ATTENTION DEFICIT HYPERACTIVITY DISORDER (ADHD), COMBINED TYPE: ICD-10-CM

## 2021-04-02 ENCOUNTER — BULK ORDERING (OUTPATIENT)
Dept: CASE MANAGEMENT | Facility: OTHER | Age: 41
End: 2021-04-02

## 2021-04-02 DIAGNOSIS — Z23 IMMUNIZATION DUE: ICD-10-CM

## 2021-04-19 DIAGNOSIS — F90.2 ATTENTION DEFICIT HYPERACTIVITY DISORDER (ADHD), COMBINED TYPE: ICD-10-CM

## 2021-05-03 ENCOUNTER — OFFICE VISIT (OUTPATIENT)
Dept: FAMILY MEDICINE CLINIC | Facility: CLINIC | Age: 41
End: 2021-05-03

## 2021-05-03 VITALS
DIASTOLIC BLOOD PRESSURE: 70 MMHG | HEIGHT: 61 IN | BODY MASS INDEX: 28.4 KG/M2 | HEART RATE: 62 BPM | OXYGEN SATURATION: 97 % | SYSTOLIC BLOOD PRESSURE: 102 MMHG | WEIGHT: 150.4 LBS | TEMPERATURE: 97.7 F

## 2021-05-03 DIAGNOSIS — F41.9 ANXIETY: ICD-10-CM

## 2021-05-03 DIAGNOSIS — R63.5 WEIGHT GAIN: ICD-10-CM

## 2021-05-03 DIAGNOSIS — F90.2 ATTENTION DEFICIT HYPERACTIVITY DISORDER (ADHD), COMBINED TYPE: Primary | ICD-10-CM

## 2021-05-03 PROCEDURE — 99214 OFFICE O/P EST MOD 30 MIN: CPT | Performed by: FAMILY MEDICINE

## 2021-05-03 RX ORDER — CETIRIZINE HYDROCHLORIDE 10 MG/1
10 TABLET ORAL DAILY
COMMUNITY
Start: 2020-09-01 | End: 2022-11-30

## 2021-05-03 RX ORDER — ESCITALOPRAM OXALATE 10 MG/1
10 TABLET ORAL DAILY
Qty: 90 TABLET | Refills: 1 | Status: SHIPPED | OUTPATIENT
Start: 2021-05-03 | End: 2021-12-14

## 2021-05-03 NOTE — PATIENT INSTRUCTIONS
Continue current treatment plan.  Recommend low fat/low calorie diet and exercise greater than 150 minutes of cardio per week.

## 2021-05-03 NOTE — PROGRESS NOTES
Subjective   Nely Gonzales is a 40 y.o. female.     Vitals:    05/03/21 1229   BP: 102/70   Pulse: 62   Temp: 97.7 °F (36.5 °C)   SpO2: 97%        Chief Complaint   Patient presents with   • ADHD     MEDICATION REFILL   • Anxiety        History of Present Illness    3-month follow-up for ADHD, AJ, and complaints of weight gain.    LOV with me in February for med refills only.  No medication changes were made at that visit.    Overall, continues to do very well.  No complaints today.  However, is frustrated with her 10 pound weight gain in the last year!  Blames this on Covid and working from home.  Eats a fairly healthy well-balanced diet.  Yet, no additional cardio exercise.  Recently, did obtain a treadmill and has plans of starting to exercise more.  Weight gain of about 10 pounds in the last 6 months noted, although stable from last visit in February.  BMI 28.4    Otherwise, just needs all refills today.  Compliant with and tolerates all medications without side effects.  Still takes Vyvanse 30 mg twice daily, pretty much on a daily basis.  Also, takes Lexapro 10 mg daily.  Sleeping well.  Appetite unchanged.  Denies palpitations, chest pain.  Last urine tox screen for compliance was at last visit in February and consistent with prescription history.  Gopi report reviewed by me today shows last refill for Vyvanse a quantity of 60 on 4/20/2021.  Low risk patient behavior.    The following portions of the patient's history were reviewed and updated as appropriate: allergies, current medications, past family history, past medical history, past social history, past surgical history and problem list.    Review of Systems   Constitutional: Negative for fever, unexpected weight gain and unexpected weight loss.   Respiratory: Negative for cough and shortness of breath.    Cardiovascular: Negative for chest pain.       Objective   Physical Exam  Vitals and nursing note reviewed.   Constitutional:       Appearance:  Normal appearance. She is well-developed and normal weight.   HENT:      Head: Normocephalic and atraumatic.      Nose: Nose normal.   Eyes:      Conjunctiva/sclera: Conjunctivae normal.      Pupils: Pupils are equal, round, and reactive to light.   Neck:      Thyroid: No thyromegaly.   Cardiovascular:      Rate and Rhythm: Normal rate and regular rhythm.      Heart sounds: Normal heart sounds. No murmur heard.     Pulmonary:      Effort: Pulmonary effort is normal.      Breath sounds: Normal breath sounds.   Abdominal:      General: Abdomen is flat. Bowel sounds are normal. There is no distension.      Palpations: Abdomen is soft. There is no hepatomegaly, splenomegaly or mass.      Tenderness: There is no abdominal tenderness. There is no guarding or rebound.      Hernia: No hernia is present.   Musculoskeletal:         General: Normal range of motion.      Cervical back: Normal range of motion and neck supple.      Right lower leg: No edema.      Left lower leg: No edema.   Lymphadenopathy:      Cervical: No cervical adenopathy.   Skin:     General: Skin is warm.   Neurological:      General: No focal deficit present.      Mental Status: She is alert.   Psychiatric:         Mood and Affect: Mood normal.         Behavior: Behavior normal.         Thought Content: Thought content normal.         Judgment: Judgment normal.          LABS/STUDIES  -July 2020 --TSH, CBC, CMP, lipids all within normal limits    Procedures     Assessment/Plan   Diagnoses and all orders for this visit:    1. Attention deficit hyperactivity disorder (ADHD), combined type (Primary)  -stable.  Urine tox screen/February and Gopi both reviewed, appropriate.  No change with medication.  Refill Vyvanse 30 mg 1 p.o. twice daily, next refill due 5/19/2021.  -     lisdexamfetamine (VYVANSE) 30 MG capsule; Take 1 capsule by mouth 2 (Two) Times a Day  Dispense: 60 capsule; Refill: 0    2. Anxiety  -well-controlled.  No change of medication.  Refill  Lexapro 10 mg 1 p.o. daily    3. Weight gain  -new diagnosis.  All labs including TSH up-to-date, therapeutic.  Needs low-carb/low calorie/low cholesterol diet and increase cardio exercise to greater than 150 minutes weekly  We will recheck labs, including TSH at next visit for wellness exam in approximately 3 months.    Other orders  -     escitalopram (LEXAPRO) 10 MG tablet; Take 1 tablet by mouth Daily.  Dispense: 90 tablet; Refill: 1                 Wore goggles and face mask during entire visit.    Return in about 3 months (around 8/3/2021) for Recheck, Annual physical.

## 2021-06-22 DIAGNOSIS — F90.2 ATTENTION DEFICIT HYPERACTIVITY DISORDER (ADHD), COMBINED TYPE: ICD-10-CM

## 2021-07-26 ENCOUNTER — OFFICE VISIT (OUTPATIENT)
Dept: FAMILY MEDICINE CLINIC | Facility: CLINIC | Age: 41
End: 2021-07-26

## 2021-07-26 VITALS
HEART RATE: 84 BPM | OXYGEN SATURATION: 97 % | HEIGHT: 61 IN | WEIGHT: 149.2 LBS | TEMPERATURE: 97.5 F | SYSTOLIC BLOOD PRESSURE: 90 MMHG | BODY MASS INDEX: 28.17 KG/M2 | DIASTOLIC BLOOD PRESSURE: 52 MMHG

## 2021-07-26 DIAGNOSIS — F90.2 ATTENTION DEFICIT HYPERACTIVITY DISORDER (ADHD), COMBINED TYPE: ICD-10-CM

## 2021-07-26 DIAGNOSIS — R63.5 WEIGHT GAIN: ICD-10-CM

## 2021-07-26 DIAGNOSIS — Z00.00 WELLNESS EXAMINATION: Primary | ICD-10-CM

## 2021-07-26 PROCEDURE — 99396 PREV VISIT EST AGE 40-64: CPT | Performed by: FAMILY MEDICINE

## 2021-07-26 RX ORDER — DOCUSATE SODIUM 100 MG/1
100 CAPSULE, LIQUID FILLED ORAL 2 TIMES DAILY PRN
COMMUNITY

## 2021-07-26 NOTE — PROGRESS NOTES
Subjective   Nely Gonzales is a 41 y.o. female.     Vitals:    07/26/21 0906   BP: 90/52   Pulse: 84   Temp: 97.5 °F (36.4 °C)   SpO2: 97%        Chief Complaint   Patient presents with   • Annual Exam     GENERAL WELLNESS PATIENT IS FASTING BIOMETRIC SCREENING  HAS GYN FOR FEMALE WELLNESS   • ADHD        History of Present Illness    Presents for annual wellness exam  And  3-month med refill for ADHD    LOV with me in May for routine med refills and discussions regarding weight gain.  At that visit --no medication changes were made.  However, the plan was to recheck TSH with today's annual physical labs.  She was asked to improve upon a healthier diet and increase her cardio exercise.    Overall, continues to do well.  On a good note, no further weight gain.  Yet, no significant weight loss either.  She has started to eat healthier and has good intentions to start increasing her cardio exercise in near future (has a plan in place.)  She did try to decrease her dose of Lexapro from 10 mg to 5 mg to see if this would help with any weight loss.  However, soon found out that she needed the 10 mg dose for her AJ!    Requests biometric forms to be completed today with wellness labs.  Compliant with and tolerates all medications without side effects.  Still takes Vyvanse 30 mg twice daily.  Last Urine Tox screen for compliance was in February 2021 and consistent with prescription history.  Gopi report reviewed by me today shows last refill for Vyvanse quantity of 60 on 6/24/2021.    Routine health maintenance/screening test:  Has GYN doc for Pap, mammogram, normal and up-to-date.  Vaccines up-to-date including Covid vaccine and Tdap done within the last 5 years.  Non-smoker, social alcohol only.  Working to improve upon a healthier well-balanced diet and cardio exercise.  Family history negative for colon cancer, premature CAD, breast cancer    The following portions of the patient's history were reviewed and updated  as appropriate: allergies, current medications, past family history, past medical history, past social history, past surgical history and problem list.    Review of Systems   Constitutional: Negative for fever, unexpected weight gain and unexpected weight loss.   Respiratory: Negative for cough and shortness of breath.    Cardiovascular: Negative for chest pain.       Objective   Physical Exam  Vitals and nursing note reviewed.   Constitutional:       Appearance: Normal appearance. She is well-developed.   HENT:      Head: Normocephalic and atraumatic.      Nose: Nose normal.   Eyes:      Conjunctiva/sclera: Conjunctivae normal.      Pupils: Pupils are equal, round, and reactive to light.   Neck:      Thyroid: No thyromegaly.   Cardiovascular:      Rate and Rhythm: Normal rate and regular rhythm.      Heart sounds: Normal heart sounds. No murmur heard.     Pulmonary:      Effort: Pulmonary effort is normal.      Breath sounds: Normal breath sounds.   Abdominal:      General: Abdomen is flat. Bowel sounds are normal. There is no distension.      Palpations: Abdomen is soft. There is no hepatomegaly, splenomegaly or mass.      Tenderness: There is no abdominal tenderness. There is no guarding or rebound.      Hernia: No hernia is present.   Musculoskeletal:         General: Normal range of motion.      Cervical back: Normal range of motion and neck supple.      Right lower leg: No edema.      Left lower leg: No edema.   Lymphadenopathy:      Cervical: No cervical adenopathy.   Skin:     General: Skin is warm.      Comments: Sees dermatologist yearly   Neurological:      General: No focal deficit present.      Mental Status: She is alert.   Psychiatric:         Mood and Affect: Mood normal.         Behavior: Behavior normal.         Thought Content: Thought content normal.         Judgment: Judgment normal.          LABS/STUDIES  -July 2020 --all labs WNL    Procedures     Assessment/Plan   Diagnoses and all orders for  this visit:    1. Wellness examination (Primary)  -within normal limits.  Has GYN doc.  All screening up-to-date except for needs labs listed below.  Will complete biometric forms pending lab results.  Otherwise, continue to improve upon healthy well-balanced diet that includes fruits, vegetables, and increase cardio exercise to greater than 150 minutes weekly.  -     CBC & Differential  -     Comprehensive Metabolic Panel  -     Lipid Panel With LDL / HDL Ratio    2. Attention deficit hyperactivity disorder (ADHD), combined type  -stable.  Urine Tox screen/February and Gopi both reviewed, appropriate.  No change with medication.  Refill Vyvanse 30 mg twice daily  -     lisdexamfetamine (VYVANSE) 30 MG capsule; Take 1 capsule by mouth 2 (Two) Times a Day  Dispense: 60 capsule; Refill: 0    3. Weight gain  -appears to have stabilized.  Check lipids and TSH  Again, stressed the importance of to improve upon a low-carb/low calorie/low fat diet and must increase her cardio exercise to greater than 150 to 200 minutes weekly.!!  -     Lipid Panel With LDL / HDL Ratio  -     TSH                 Wore goggles and face mask during entire visit.    Return in about 3 months (around 10/26/2021) for Recheck.

## 2021-07-27 LAB
ALBUMIN SERPL-MCNC: 4.5 G/DL (ref 3.5–5.2)
ALBUMIN/GLOB SERPL: 2 G/DL
ALP SERPL-CCNC: 33 U/L (ref 39–117)
ALT SERPL-CCNC: 19 U/L (ref 1–33)
AST SERPL-CCNC: 20 U/L (ref 1–32)
BASOPHILS # BLD AUTO: 0.03 10*3/MM3 (ref 0–0.2)
BASOPHILS NFR BLD AUTO: 0.4 % (ref 0–1.5)
BILIRUB SERPL-MCNC: 0.4 MG/DL (ref 0–1.2)
BUN SERPL-MCNC: 11 MG/DL (ref 6–20)
BUN/CREAT SERPL: 13.8 (ref 7–25)
CALCIUM SERPL-MCNC: 9.2 MG/DL (ref 8.6–10.5)
CHLORIDE SERPL-SCNC: 106 MMOL/L (ref 98–107)
CHOLEST SERPL-MCNC: 193 MG/DL (ref 0–200)
CO2 SERPL-SCNC: 24.4 MMOL/L (ref 22–29)
CREAT SERPL-MCNC: 0.8 MG/DL (ref 0.57–1)
EOSINOPHIL # BLD AUTO: 0.28 10*3/MM3 (ref 0–0.4)
EOSINOPHIL NFR BLD AUTO: 3.9 % (ref 0.3–6.2)
ERYTHROCYTE [DISTWIDTH] IN BLOOD BY AUTOMATED COUNT: 11.7 % (ref 12.3–15.4)
GLOBULIN SER CALC-MCNC: 2.3 GM/DL
GLUCOSE SERPL-MCNC: 77 MG/DL (ref 65–99)
HCT VFR BLD AUTO: 40.2 % (ref 34–46.6)
HDLC SERPL-MCNC: 59 MG/DL (ref 40–60)
HGB BLD-MCNC: 13.7 G/DL (ref 12–15.9)
IMM GRANULOCYTES # BLD AUTO: 0.03 10*3/MM3 (ref 0–0.05)
IMM GRANULOCYTES NFR BLD AUTO: 0.4 % (ref 0–0.5)
LDLC SERPL CALC-MCNC: 119 MG/DL (ref 0–100)
LDLC/HDLC SERPL: 2 {RATIO}
LYMPHOCYTES # BLD AUTO: 2.03 10*3/MM3 (ref 0.7–3.1)
LYMPHOCYTES NFR BLD AUTO: 28.3 % (ref 19.6–45.3)
MCH RBC QN AUTO: 30.3 PG (ref 26.6–33)
MCHC RBC AUTO-ENTMCNC: 34.1 G/DL (ref 31.5–35.7)
MCV RBC AUTO: 88.9 FL (ref 79–97)
MONOCYTES # BLD AUTO: 0.37 10*3/MM3 (ref 0.1–0.9)
MONOCYTES NFR BLD AUTO: 5.2 % (ref 5–12)
NEUTROPHILS # BLD AUTO: 4.43 10*3/MM3 (ref 1.7–7)
NEUTROPHILS NFR BLD AUTO: 61.8 % (ref 42.7–76)
NRBC BLD AUTO-RTO: 0 /100 WBC (ref 0–0.2)
PLATELET # BLD AUTO: 362 10*3/MM3 (ref 140–450)
POTASSIUM SERPL-SCNC: 4.5 MMOL/L (ref 3.5–5.2)
PROT SERPL-MCNC: 6.8 G/DL (ref 6–8.5)
RBC # BLD AUTO: 4.52 10*6/MM3 (ref 3.77–5.28)
SODIUM SERPL-SCNC: 143 MMOL/L (ref 136–145)
TRIGL SERPL-MCNC: 81 MG/DL (ref 0–150)
TSH SERPL DL<=0.005 MIU/L-ACNC: 1.72 UIU/ML (ref 0.27–4.2)
VLDLC SERPL CALC-MCNC: 15 MG/DL (ref 5–40)
WBC # BLD AUTO: 7.17 10*3/MM3 (ref 3.4–10.8)

## 2021-08-26 ENCOUNTER — TELEPHONE (OUTPATIENT)
Dept: FAMILY MEDICINE CLINIC | Facility: CLINIC | Age: 41
End: 2021-08-26

## 2021-08-28 DIAGNOSIS — F90.2 ATTENTION DEFICIT HYPERACTIVITY DISORDER (ADHD), COMBINED TYPE: ICD-10-CM

## 2021-09-15 ENCOUNTER — OFFICE VISIT (OUTPATIENT)
Dept: CARDIOLOGY | Facility: CLINIC | Age: 41
End: 2021-09-15

## 2021-09-15 VITALS
WEIGHT: 149.6 LBS | HEART RATE: 91 BPM | HEIGHT: 61 IN | DIASTOLIC BLOOD PRESSURE: 68 MMHG | BODY MASS INDEX: 28.25 KG/M2 | OXYGEN SATURATION: 98 % | SYSTOLIC BLOOD PRESSURE: 109 MMHG

## 2021-09-15 DIAGNOSIS — I49.3 PVC'S (PREMATURE VENTRICULAR CONTRACTIONS): ICD-10-CM

## 2021-09-15 DIAGNOSIS — R00.2 PALPITATIONS: Primary | ICD-10-CM

## 2021-09-15 PROCEDURE — 99204 OFFICE O/P NEW MOD 45 MIN: CPT | Performed by: INTERNAL MEDICINE

## 2021-09-15 RX ORDER — CHLORAL HYDRATE 500 MG
CAPSULE ORAL
COMMUNITY

## 2021-09-16 NOTE — PROGRESS NOTES
"Date of Office Visit: 09/15/2021  Encounter Provider: Osbaldo Finnegan MD  Place of Service: [facility] CARDIOLOGY  Patient Name: Nely Gonzales  :1980    Chief complaint: Palpitations, PVCs.    History of Present Illness:    I had the pleasure of seeing the patient and cardiology office on 9/15/2021.  She is a very pleasant 41 year-old female with a history of COVID-19 in 2020 who presents for evaluation.  She is also a nurse, and currently works in the [employer].    She states that she has had palpitations for years, which she felt were very likely PVCs.  However, she had COVID-19 in 2020, and she states that the palpitations increase shortly afterwards.  She states that she feels fluttering sensations when these occur, and her heart rate can be \"irregularly irregular\" at times.  She does feel short of breath when these occur, although she has not had significant lightheaded episodes or near syncope.  She has not had any chest discomfort.  She had an EKG from several days ago which did show PVCs, although I do not have a copy of the EKG at the time of this dictation.  She does not have a family history of coronary artery disease.    Past Medical History:   Diagnosis Date   • ADHD (attention deficit hyperactivity disorder)    • Allergic    • Anxiety    • COVID-19 2020   • Kidney stone        Past Surgical History:   Procedure Laterality Date   • BREAST SURGERY      augmentation   • TONSILLECTOMY         Current Outpatient Medications on File Prior to Visit   Medication Sig Dispense Refill   • ALYACEN  1-35 MG-MCG per tablet Take 1 tablet by mouth Daily.     • cetirizine (zyrTEC) 10 MG tablet Take 10 mg by mouth Daily.     • docusate sodium (COLACE) 100 MG capsule Take 100 mg by mouth 2 (Two) Times a Day As Needed for Constipation.     • escitalopram (LEXAPRO) 10 MG tablet Take 1 tablet by mouth Daily. 90 tablet 1   • lisdexamfetamine " "(VYVANSE) 30 MG capsule Take 1 capsule by mouth 2 (Two) Times a Day 60 capsule 0   • Omega-3 Fatty Acids (fish oil) 1000 MG capsule capsule Take  by mouth Daily With Breakfast.     • Probiotic Product (PROBIOTIC BLEND PO) Take  by mouth.       No current facility-administered medications on file prior to visit.     Allergies as of 09/15/2021 - Reviewed 07/26/2021   Allergen Reaction Noted   • Erythromycin Other (See Comments) 01/01/1998     Social History     Socioeconomic History   • Marital status: Single     Spouse name: Not on file   • Number of children: Not on file   • Years of education: Not on file   • Highest education level: Not on file   Tobacco Use   • Smoking status: Never Smoker   • Smokeless tobacco: Never Used   Substance and Sexual Activity   • Alcohol use: Yes     Alcohol/week: 4.0 standard drinks     Types: 4 Glasses of wine per week     Comment: Socially   • Drug use: No   • Sexual activity: Yes     Partners: Male     Family History   Problem Relation Age of Onset   • Hypertension Mother    • Anxiety disorder Mother    • Depression Mother    • Hyperlipidemia Father    • Hypertension Father    • Anxiety disorder Sister    • Depression Sister        Review of Systems   Constitutional: Positive for weight gain.   Cardiovascular: Positive for palpitations.   All other systems reviewed and are negative.     Objective:     Vitals:    09/15/21 0815   BP: 109/68   Pulse: 91   SpO2: 98%   Weight: 67.9 kg (149 lb 9.6 oz)   Height: 154.9 cm (60.98\")     Body mass index is 28.28 kg/m².    Constitutional:       Appearance: Healthy appearance. Well-developed.   Eyes:      Conjunctiva/sclera: Conjunctivae normal.   HENT:      Head: Normocephalic and atraumatic.   Pulmonary:      Effort: Pulmonary effort is normal.      Breath sounds: Normal breath sounds.   Cardiovascular:      Normal rate. Regular rhythm.      Murmurs: There is no murmur.      No gallop.   Edema:     Peripheral edema absent.   Abdominal:      " "Palpations: Abdomen is soft.      Tenderness: There is no abdominal tenderness.   Musculoskeletal:      Cervical back: Neck supple. Skin:     General: Skin is warm.   Neurological:      Mental Status: Alert and oriented to person, place, and time.   Psychiatric:         Behavior: Behavior normal.       Lab Review:   Procedures    Lipid Panel    Lipid Panel 7/26/21   Total Cholesterol 193   Triglycerides 81   HDL Cholesterol 59   VLDL Cholesterol 15   LDL Cholesterol  119 (A)   LDL/HDL Ratio 2.00   (A) Abnormal value       Comments are available for some flowsheets but are not being displayed.            Cardiac Procedures:      Assessment:       Diagnosis Plan   1. Palpitations  Holter Monitor - 24 Hour   2. PVC's (premature ventricular contractions)  Holter Monitor - 24 Hour     Plan:       Again, I suspect that the source of the palpitations are from PVCs.  She had an EKG several days ago which reportedly showed PVCs.  I also suspect that the \"regularly irregular\" rhythm that she occasionally experiences is very likely ventricular bigeminy.  She also felt that this was the case.  She is not overly symptomatic, but does feel fluttering and occasionally shortness of breath when these occur.  For now, I am going to simply check a 24-hour Holter monitor to assess her PVC burden and to ensure that she is not having other arrhythmias.  I do not feel that she needs a daily beta-blocker currently, although she might be a candidate for an as needed beta-blocker in the future.  Her blood pressure typically runs low, and this should also be taken into consideration.  Further plans will be made pending the results of the 24-hour Holter monitor.      "

## 2021-10-01 DIAGNOSIS — F90.2 ATTENTION DEFICIT HYPERACTIVITY DISORDER (ADHD), COMBINED TYPE: ICD-10-CM

## 2021-10-01 NOTE — TELEPHONE ENCOUNTER
Rx Refill Note  Requested Prescriptions     Pending Prescriptions Disp Refills   • lisdexamfetamine (VYVANSE) 30 MG capsule 60 capsule 0     Sig: Take 1 capsule by mouth 2 (Two) Times a Day      Last office visit with prescribing clinician: 7/26/2021      Next office visit with prescribing clinician: 11/5/2021            Rocael Varela MA  10/01/21, 12:44 EDT

## 2021-10-01 NOTE — TELEPHONE ENCOUNTER
----- Message from Nely Gonzales sent at 10/1/2021 12:31 PM EDT -----  Regarding: Non-Urgent Medical Question  Contact: [phone]  Just realized I never got back in touch with you. I was wanting to get 24 holter monitor to ensure there weren’t any concerns since I had noticed the PVC’s more frequently over the past year & have had some fatigue (probably just getting old, or stress-related). The wait list for cardiology at [facility] (in network for my insurance) was booked months out so I contacted a cardiologist I knew & he got me in the office quickly. It confirms what you already knew, that I have frequent PVC’s & there really isn’t any treatment since my bp already runs too low to tolerate the medications used for treatment. Just wanted to make sure there wasn’t anything to be worried about while running cross  country with the kiddo.    Thanks so much!  Nely Gonzales

## 2021-11-05 ENCOUNTER — OFFICE VISIT (OUTPATIENT)
Dept: FAMILY MEDICINE CLINIC | Facility: CLINIC | Age: 41
End: 2021-11-05

## 2021-11-05 VITALS
OXYGEN SATURATION: 98 % | HEIGHT: 61 IN | TEMPERATURE: 97.1 F | WEIGHT: 149.6 LBS | BODY MASS INDEX: 28.25 KG/M2 | HEART RATE: 80 BPM | DIASTOLIC BLOOD PRESSURE: 70 MMHG | SYSTOLIC BLOOD PRESSURE: 110 MMHG

## 2021-11-05 DIAGNOSIS — F90.2 ATTENTION DEFICIT HYPERACTIVITY DISORDER (ADHD), COMBINED TYPE: Primary | ICD-10-CM

## 2021-11-05 PROBLEM — I49.1 PREMATURE ATRIAL CONTRACTIONS: Status: ACTIVE | Noted: 2021-11-05

## 2021-11-05 PROCEDURE — 99213 OFFICE O/P EST LOW 20 MIN: CPT | Performed by: FAMILY MEDICINE

## 2021-11-05 NOTE — PROGRESS NOTES
Subjective   Nely Gonzales is a 41 y.o. female.     Vitals:    11/05/21 1127   BP: 110/70   Pulse: 80   Temp: 97.1 °F (36.2 °C)   SpO2: 98%        Chief Complaint   Patient presents with   • ADHD     MED REFILL        History of Present Illness    3-month follow-up for ADHD    LOV with me in July for wellness exam and chronic med refills.  No changes made at that visit as all labs are WNL.    Overall, continues to do well.  She did see cardiologist/Dr. Finnegan in September for palpitations --Holter monitor was placed, consistent with frequent PACs and PVCs, no significant arrhythmias.  Consultation suggested just decreasing her caffeine use, no beta-blocker indicated given low BPs and relatively asymptomatic.    No complaints today.  Compliant with and tolerates all medications without side effects.  Still takes Vyvanse 30 mg twice daily for ADHD --works full-time.  Medication works well.  No changes in schedule.  Takes medication twice daily every day.  Tries to maintain a healthy diet, cardio exercise is sporadic.  Weight stable.  Refills needed today.  Last Urine Tox screen for compliance was in February 2021 and consistent with prescription history.  Gopi report reviewed by me today shows Vyvanse last refilled for quantity of 60 on 10/3/2021    The following portions of the patient's history were reviewed and updated as appropriate: allergies, current medications, past family history, past medical history, past social history, past surgical history and problem list.    Review of Systems   Constitutional: Negative for fever, unexpected weight gain and unexpected weight loss.   Respiratory: Negative for cough and shortness of breath.    Cardiovascular: Negative for chest pain.       Objective   Physical Exam  Vitals and nursing note reviewed.   Constitutional:       Appearance: Normal appearance. She is well-developed and normal weight.   HENT:      Head: Normocephalic and atraumatic.      Nose: Nose normal.    Neck:      Thyroid: No thyromegaly.   Cardiovascular:      Rate and Rhythm: Normal rate. Rhythm irregular.      Heart sounds: Normal heart sounds.   Pulmonary:      Effort: Pulmonary effort is normal.      Breath sounds: Normal breath sounds.   Abdominal:      General: Abdomen is flat. Bowel sounds are normal. There is no distension.      Palpations: Abdomen is soft.      Tenderness: There is no abdominal tenderness.   Musculoskeletal:      Cervical back: Normal range of motion and neck supple.      Right lower leg: No edema.      Left lower leg: No edema.   Lymphadenopathy:      Cervical: No cervical adenopathy.   Neurological:      Mental Status: She is alert.          LABS/STUDIES  -July 2021 --CMP, CBC, TSH, lipids all WNL    Procedures     Assessment/Plan   Diagnoses and all orders for this visit:    1. Attention deficit hyperactivity disorder (ADHD), combined type (Primary)  -well-controlled.  No change with medication.  Urine Tox screen/February and Gopi both reviewed, appropriate.  Refill Vyvanse 30 mg twice daily.  Recheck in 3 months  -     lisdexamfetamine (VYVANSE) 30 MG capsule; Take 1 capsule by mouth 2 (Two) Times a Day  Dispense: 60 capsule; Refill: 0                 Wore goggles and face mask during entire visit.    Return in about 3 months (around 2/5/2022) for Recheck.     Answers for HPI/ROS submitted by the patient on 11/5/2021  Please describe your symptoms.: Medication check  Have you had these symptoms before?: Yes  How long have you been having these symptoms?: Greater than 2 weeks  Please list any medications you are currently taking for this condition.: Vyvanse 30mg BID  What is the primary reason for your visit?: Other

## 2021-12-06 DIAGNOSIS — F90.2 ATTENTION DEFICIT HYPERACTIVITY DISORDER (ADHD), COMBINED TYPE: ICD-10-CM

## 2021-12-06 NOTE — TELEPHONE ENCOUNTER
Rx Refill Note  Requested Prescriptions     Pending Prescriptions Disp Refills   • lisdexamfetamine (VYVANSE) 30 MG capsule 60 capsule 0     Sig: Take 1 capsule by mouth 2 (Two) Times a Day      Last office visit with prescribing clinician: 11/5/2021      Next office visit with prescribing clinician: 2/15/2022            Rocael Varela MA  12/06/21, 09:51 EST

## 2021-12-14 RX ORDER — ESCITALOPRAM OXALATE 10 MG/1
TABLET ORAL
Qty: 90 TABLET | Refills: 1 | Status: SHIPPED | OUTPATIENT
Start: 2021-12-14 | End: 2022-02-06 | Stop reason: SDUPTHER

## 2022-01-06 DIAGNOSIS — F90.2 ATTENTION DEFICIT HYPERACTIVITY DISORDER (ADHD), COMBINED TYPE: ICD-10-CM

## 2022-01-06 NOTE — TELEPHONE ENCOUNTER
Rx Refill Note  Requested Prescriptions     Pending Prescriptions Disp Refills   • lisdexamfetamine (VYVANSE) 30 MG capsule 60 capsule 0     Sig: Take 1 capsule by mouth 2 (Two) Times a Day      Last office visit with prescribing clinician: 11/5/2021      Next office visit with prescribing clinician: 2/15/2022            Rocael Varela MA  01/06/22, 09:20 EST

## 2022-02-06 DIAGNOSIS — F90.2 ATTENTION DEFICIT HYPERACTIVITY DISORDER (ADHD), COMBINED TYPE: ICD-10-CM

## 2022-02-07 RX ORDER — ESCITALOPRAM OXALATE 10 MG/1
10 TABLET ORAL DAILY
Qty: 90 TABLET | Refills: 1 | Status: SHIPPED | OUTPATIENT
Start: 2022-02-07 | End: 2022-04-30 | Stop reason: SDUPTHER

## 2022-02-07 NOTE — TELEPHONE ENCOUNTER
Rx Refill Note  Requested Prescriptions     Pending Prescriptions Disp Refills   • lisdexamfetamine (VYVANSE) 30 MG capsule 60 capsule 0     Sig: Take 1 capsule by mouth 2 (Two) Times a Day      Last office visit with prescribing clinician: 11/5/2021      Next office visit with prescribing clinician: 2/15/2022            Rocael Varela MA  02/07/22, 08:55 EST

## 2022-02-15 ENCOUNTER — TELEPHONE (OUTPATIENT)
Dept: FAMILY MEDICINE CLINIC | Facility: CLINIC | Age: 42
End: 2022-02-15

## 2022-02-15 ENCOUNTER — OFFICE VISIT (OUTPATIENT)
Dept: FAMILY MEDICINE CLINIC | Facility: CLINIC | Age: 42
End: 2022-02-15

## 2022-02-15 VITALS
DIASTOLIC BLOOD PRESSURE: 54 MMHG | OXYGEN SATURATION: 97 % | SYSTOLIC BLOOD PRESSURE: 100 MMHG | BODY MASS INDEX: 29.27 KG/M2 | HEART RATE: 73 BPM | HEIGHT: 61 IN | TEMPERATURE: 97.9 F | WEIGHT: 155 LBS

## 2022-02-15 DIAGNOSIS — F41.9 ANXIETY: ICD-10-CM

## 2022-02-15 DIAGNOSIS — R53.83 OTHER FATIGUE: ICD-10-CM

## 2022-02-15 DIAGNOSIS — R00.1 BRADYCARDIA: ICD-10-CM

## 2022-02-15 DIAGNOSIS — Z00.00 WELLNESS EXAMINATION: Primary | ICD-10-CM

## 2022-02-15 DIAGNOSIS — R63.5 WEIGHT GAIN: ICD-10-CM

## 2022-02-15 DIAGNOSIS — F90.2 ATTENTION DEFICIT HYPERACTIVITY DISORDER (ADHD), COMBINED TYPE: Primary | ICD-10-CM

## 2022-02-15 DIAGNOSIS — Z79.899 HIGH RISK MEDICATION USE: ICD-10-CM

## 2022-02-15 DIAGNOSIS — R19.7 DIARRHEA, UNSPECIFIED TYPE: ICD-10-CM

## 2022-02-15 PROCEDURE — 99214 OFFICE O/P EST MOD 30 MIN: CPT | Performed by: FAMILY MEDICINE

## 2022-02-15 NOTE — PROGRESS NOTES
"Chief Complaint  Fatigue (NOT FEELING WELL PATIENT IS NOT FASTING SHE HAD DIARRHEA YESTERDAY ) and ADHD     3-month follow-up for ADHD and complaints of fatigue    LOV with me in November for chronic med refills only.  No changes made at that visit.    Overall, continues to do well.  However, she does complain of intermittent episodes of chronic fatigue x6 months.  Initially she thought this may have been related to her PVCs--seeing cardiology, work-up done.  Decreased caffeine use has improved the PVCs, but now tired.  Sleeps well, 8 hours of uninterrupted sleep per night.  No snoring.  She often does feel like she can take a daytime nap.  Weight gain of approximately 5 to 10 pounds within the last 1 year.  Tries to maintain a healthy diet, yet no regular consistent cardio exercise.  Routine labs done last July 2021, WNL.  She did have a few episodes of diarrhea yesterday only.  No abdominal pain, nausea, or vomiting.  No URI symptoms.  Does have more stressors going on at work, understaffed, overworked.    Otherwise, just needs chronic med refills today.  Compliant with and tolerates all medications without side effects.  Still takes Vyvanse 30 mg twice daily for chronic ADD.    Review of Systems   Constitutional: Positive for fatigue. Negative for fever and unexpected weight change.   Respiratory: Negative for cough and shortness of breath.    Cardiovascular: Negative for chest pain.        Subjective          Nely Gonzales presents to [facility]    Objective   Vital Signs:   Vitals:    02/15/22 0935   BP: 100/54   Pulse: 73   Temp: 97.9 °F (36.6 °C)   SpO2: 97%   Weight: 70.3 kg (155 lb)   Height: 154.9 cm (61\")      Physical Exam  Vitals and nursing note reviewed.   Constitutional:       Appearance: Normal appearance. She is well-developed. She is obese.   HENT:      Head: Normocephalic and atraumatic.      Nose: Nose normal.   Eyes:      Conjunctiva/sclera: Conjunctivae normal. "      Pupils: Pupils are equal, round, and reactive to light.   Neck:      Thyroid: No thyromegaly.   Cardiovascular:      Rate and Rhythm: Normal rate and regular rhythm.      Heart sounds: Normal heart sounds. No murmur heard.      Pulmonary:      Effort: Pulmonary effort is normal.      Breath sounds: Normal breath sounds.   Abdominal:      General: Abdomen is flat. Bowel sounds are normal. There is no distension.      Palpations: Abdomen is soft. There is no hepatomegaly, splenomegaly or mass.      Tenderness: There is no abdominal tenderness. There is no guarding or rebound.      Hernia: No hernia is present.   Musculoskeletal:         General: Normal range of motion.      Cervical back: Normal range of motion and neck supple.      Right lower leg: No edema.      Left lower leg: No edema.   Lymphadenopathy:      Cervical: No cervical adenopathy.   Skin:     General: Skin is warm.   Neurological:      General: No focal deficit present.      Mental Status: She is alert.   Psychiatric:         Mood and Affect: Mood normal.         Behavior: Behavior normal.         Thought Content: Thought content normal.         Judgment: Judgment normal.        Result Review :     CMP    CMP 7/26/21   Glucose 77   BUN 11   Creatinine 0.80   eGFR Non  Am 79   eGFR  Am 96   Sodium 143   Potassium 4.5   Chloride 106   Calcium 9.2   Total Protein 6.8   Albumin 4.50   Globulin 2.3   Total Bilirubin 0.4   Alkaline Phosphatase 33 (A)   AST (SGOT) 20   ALT (SGPT) 19   (A) Abnormal value       Comments are available for some flowsheets but are not being displayed.           CBC w/diff    CBC w/Diff 7/26/21   WBC 7.17   RBC 4.52   Hemoglobin 13.7   Hematocrit 40.2   MCV 88.9   MCH 30.3   MCHC 34.1   RDW 11.7 (A)   Platelets 362   Neutrophil Rel % 61.8   Lymphocyte Rel % 28.3   Monocyte Rel % 5.2   Eosinophil Rel % 3.9   Basophil Rel % 0.4   (A) Abnormal value            Lipid Panel    Lipid Panel 7/26/21   Total Cholesterol  193   Triglycerides 81   HDL Cholesterol 59   VLDL Cholesterol 15   LDL Cholesterol  119 (A)   LDL/HDL Ratio 2.00   (A) Abnormal value       Comments are available for some flowsheets but are not being displayed.           TSH    TSH 7/26/21   TSH 1.720                    Assessment and Plan    Diagnoses and all orders for this visit:    1. Attention deficit hyperactivity disorder (ADHD), combined type (Primary)  -well-controlled.  No change with medication.  Update Urine Tox screen for compliance today.  Gopi report reviewed, appropriate.  Low risk patient behavior.  Refill Vyvanse as directed below, next refill due 3/8/2022  -     lisdexamfetamine (VYVANSE) 30 MG capsule; Take 1 capsule by mouth 2 (Two) Times a Day  Dispense: 60 capsule; Refill: 0  -     ToxASSURE Select 13 Discrete -    2. Other fatigue  -new DX x6 months.  Suspect this may be related to stress and lack of exercise.  However, will complete work-up with labs listed below.  Further recommendations to follow.  Although, highly recommend increasing her cardio exercise to greater than 150 minutes weekly.  May need to consider increasing dose of Lexapro?  -     CBC & Differential  -     TSH  -     Vitamin D 1,25 Dihydroxy  -     Vitamin B12 & Folate  -     Ferritin    3. Anxiety  -fair control.  No change with Lexapro at this time.  Continue Lexapro 10 mg daily, may need to consider increasing dose or adding Wellbutrin?    4. Diarrhea, unspecified type  -x1 day only.  Likely viral or stress related.  No treatment needed at this time.  Continue with increased fluids.  If not improved follow-up.    5. High risk medication use  -     ToxASSURE Select 13 Discrete -        Follow Up   Return in about 3 months (around 5/15/2022) for Recheck.  Patient was given instructions and counseling regarding her condition or for health maintenance advice. Please see specific information pulled into the AVS if appropriate.

## 2022-02-15 NOTE — PATIENT INSTRUCTIONS
Further recommendations based on labs    Really needs to increase her cardio exercise to greater than 150 minutes weekly    If not better follow-up sooner than next regular appointment.

## 2022-02-15 NOTE — TELEPHONE ENCOUNTER
Pt stated she wants to do her labs prior to 3 month follow up. She had some labs drawn today already. Please advise

## 2022-02-20 LAB
1,25(OH)2D SERPL-MCNC: 69.6 PG/ML (ref 19.9–79.3)
6MAM UR QL CFM: NEGATIVE
6MAM/CREAT UR: NOT DETECTED NG/MG CREAT
7AMINOCLONAZEPAM/CREAT UR: NOT DETECTED NG/MG CREAT
A-OH ALPRAZ/CREAT UR: NOT DETECTED NG/MG CREAT
A-OH-TRIAZOLAM/CREAT UR CFM: NOT DETECTED NG/MG CREAT
ALFENTANIL/CREAT UR CFM: NOT DETECTED NG/MG CREAT
ALPHA-HYDROXYMIDAZOLAM, URINE: NOT DETECTED NG/MG CREAT
ALPRAZ/CREAT UR CFM: NOT DETECTED NG/MG CREAT
AMOBARBITAL UR QL CFM: NOT DETECTED
AMPHET/CREAT UR: 6891 NG/MG CREAT
AMPHETAMINES UR QL CFM: NORMAL
BARBITAL UR QL CFM: NOT DETECTED
BARBITURATES UR QL CFM: NEGATIVE
BASOPHILS # BLD AUTO: 0 X10E3/UL (ref 0–0.2)
BASOPHILS NFR BLD AUTO: 0 %
BENZODIAZ UR QL CFM: NEGATIVE
BUPRENORPHINE UR QL CFM: NEGATIVE
BUPRENORPHINE/CREAT UR: NOT DETECTED NG/MG CREAT
BUTABARBITAL UR QL CFM: NOT DETECTED
BUTALBITAL UR QL CFM: NOT DETECTED
BZE/CREAT UR: NOT DETECTED NG/MG CREAT
CANNABINOIDS UR QL CFM: NEGATIVE
CARBOXYTHC/CREAT UR: NOT DETECTED NG/MG CREAT
CLONAZEPAM/CREAT UR CFM: NOT DETECTED NG/MG CREAT
COCAETHYLENE/CREAT UR CFM: NOT DETECTED NG/MG CREAT
COCAINE UR QL CFM: NEGATIVE
COCAINE/CREAT UR CFM: NOT DETECTED NG/MG CREAT
CODEINE/CREAT UR: NOT DETECTED NG/MG CREAT
CREAT UR-MCNC: 129 MG/DL
DESALKYLFLURAZ/CREAT UR: NOT DETECTED NG/MG CREAT
DESMETHYLFLUNITRAZEPAM: NOT DETECTED NG/MG CREAT
DHC/CREAT UR: NOT DETECTED NG/MG CREAT
DIAZEPAM/CREAT UR: NOT DETECTED NG/MG CREAT
DRUGS UR: NORMAL
EDDP/CREAT UR: NOT DETECTED NG/MG CREAT
EOSINOPHIL # BLD AUTO: 0.3 X10E3/UL (ref 0–0.4)
EOSINOPHIL NFR BLD AUTO: 4 %
ERYTHROCYTE [DISTWIDTH] IN BLOOD BY AUTOMATED COUNT: 11.3 % (ref 11.7–15.4)
ETHANOL UR CFM-MCNC: NOT DETECTED G/DL
ETHANOL UR QL CFM: NEGATIVE
FENTANYL UR QL CFM: NEGATIVE
FENTANYL/CREAT UR: NOT DETECTED NG/MG CREAT
FERRITIN SERPL-MCNC: 86 NG/ML (ref 15–150)
FLUNITRAZEPAM UR QL CFM: NOT DETECTED NG/MG CREAT
FOLATE SERPL-MCNC: 17.5 NG/ML
HCT VFR BLD AUTO: 39.5 % (ref 34–46.6)
HGB BLD-MCNC: 13.2 G/DL (ref 11.1–15.9)
HYDROCODONE/CREAT UR: NOT DETECTED NG/MG CREAT
HYDROMORPHONE/CREAT UR: NOT DETECTED NG/MG CREAT
IMM GRANULOCYTES # BLD AUTO: 0 X10E3/UL (ref 0–0.1)
IMM GRANULOCYTES NFR BLD AUTO: 0 %
LEVEL OF DETECTION:: NORMAL
LORAZEPAM/CREAT UR: NOT DETECTED NG/MG CREAT
LYMPHOCYTES # BLD AUTO: 2.1 X10E3/UL (ref 0.7–3.1)
LYMPHOCYTES NFR BLD AUTO: 31 %
MCH RBC QN AUTO: 29.7 PG (ref 26.6–33)
MCHC RBC AUTO-ENTMCNC: 33.4 G/DL (ref 31.5–35.7)
MCV RBC AUTO: 89 FL (ref 79–97)
MDA/CREAT UR: NOT DETECTED NG/MG CREAT
MDMA/CREAT UR: NOT DETECTED NG/MG CREAT
MEPHOBARBITAL UR QL CFM: NOT DETECTED
METHADONE UR QL CFM: NEGATIVE
METHADONE/CREAT UR: NOT DETECTED NG/MG CREAT
METHAMPHET/CREAT UR: NOT DETECTED NG/MG CREAT
MIDAZOLAM/CREAT UR CFM: NOT DETECTED NG/MG CREAT
MONOCYTES # BLD AUTO: 0.4 X10E3/UL (ref 0.1–0.9)
MONOCYTES NFR BLD AUTO: 6 %
MORPHINE/CREAT UR: NOT DETECTED NG/MG CREAT
N-NORTRAMADOL/CREAT UR CFM: NOT DETECTED NG/MG CREAT
NARCOTICS UR: NEGATIVE
NEUTROPHILS # BLD AUTO: 4 X10E3/UL (ref 1.4–7)
NEUTROPHILS NFR BLD AUTO: 59 %
NORBUPRENORPHINE/CREAT UR: NOT DETECTED NG/MG CREAT
NORCODEINE/CREAT UR CFM: NOT DETECTED NG/MG CREAT
NORDIAZEPAM/CREAT UR: NOT DETECTED NG/MG CREAT
NORFENTANYL/CREAT UR: NOT DETECTED NG/MG CREAT
NORHYDROCODONE/CREAT UR: NOT DETECTED NG/MG CREAT
NORMORPHINE UR-MCNC: NOT DETECTED NG/MG CREAT
NOROXYCODONE/CREAT UR: NOT DETECTED NG/MG CREAT
NOROXYMORPHONE/CREAT UR CFM: NOT DETECTED NG/MG CREAT
O-NORTRAMADOL UR CFM-MCNC: NOT DETECTED NG/MG CREAT
OPIATES UR QL CFM: NEGATIVE
OXAZEPAM/CREAT UR: NOT DETECTED NG/MG CREAT
OXYCODONE UR QL CFM: NEGATIVE
OXYCODONE/CREAT UR: NOT DETECTED NG/MG CREAT
OXYMORPHONE/CREAT UR: NOT DETECTED NG/MG CREAT
PENTOBARB UR QL CFM: NOT DETECTED
PHENOBARB UR QL CFM: NOT DETECTED
PLATELET # BLD AUTO: 352 X10E3/UL (ref 150–450)
RBC # BLD AUTO: 4.44 X10E6/UL (ref 3.77–5.28)
SECOBARBITAL UR QL CFM: NOT DETECTED
SUFENTANIL/CREAT UR CFM: NOT DETECTED NG/MG CREAT
TAPENTADOL UR QL CFM: NEGATIVE
TAPENTADOL/CREAT UR: NOT DETECTED NG/MG CREAT
TEMAZEPAM/CREAT UR: NOT DETECTED NG/MG CREAT
THIOPENTAL UR QL CFM: NOT DETECTED
TRAMADOL UR QL CFM: NOT DETECTED NG/MG CREAT
TSH SERPL DL<=0.005 MIU/L-ACNC: 1.35 UIU/ML (ref 0.45–4.5)
VIT B12 SERPL-MCNC: 410 PG/ML (ref 232–1245)
WBC # BLD AUTO: 6.8 X10E3/UL (ref 3.4–10.8)

## 2022-03-13 DIAGNOSIS — F90.2 ATTENTION DEFICIT HYPERACTIVITY DISORDER (ADHD), COMBINED TYPE: ICD-10-CM

## 2022-03-14 NOTE — TELEPHONE ENCOUNTER
Rx Refill Note  Requested Prescriptions     Pending Prescriptions Disp Refills   • lisdexamfetamine (VYVANSE) 30 MG capsule 60 capsule 0     Sig: Take 1 capsule by mouth 2 (Two) Times a Day      Last office visit with prescribing clinician: 2/15/2022      Next office visit with prescribing clinician: 5/25/2022            Rocael Vraela MA  03/14/22, 14:05 EDT

## 2022-04-11 ENCOUNTER — APPOINTMENT (OUTPATIENT)
Dept: WOMENS IMAGING | Facility: HOSPITAL | Age: 42
End: 2022-04-11

## 2022-04-11 PROCEDURE — 77067 SCR MAMMO BI INCL CAD: CPT | Performed by: RADIOLOGY

## 2022-04-11 PROCEDURE — 77063 BREAST TOMOSYNTHESIS BI: CPT | Performed by: RADIOLOGY

## 2022-04-15 DIAGNOSIS — F90.2 ATTENTION DEFICIT HYPERACTIVITY DISORDER (ADHD), COMBINED TYPE: ICD-10-CM

## 2022-04-18 NOTE — TELEPHONE ENCOUNTER
Rx Refill Note  Requested Prescriptions     Pending Prescriptions Disp Refills   • lisdexamfetamine (VYVANSE) 30 MG capsule 60 capsule 0     Sig: Take 1 capsule by mouth 2 (Two) Times a Day      Last office visit with prescribing clinician: 2/15/2022      Next office visit with prescribing clinician: 5/25/2022            Rocael Varela MA  04/18/22, 07:56 EDT

## 2022-05-02 RX ORDER — ESCITALOPRAM OXALATE 10 MG/1
10 TABLET ORAL DAILY
Qty: 90 TABLET | Refills: 1 | Status: SHIPPED | OUTPATIENT
Start: 2022-05-02

## 2022-05-19 DIAGNOSIS — F90.2 ATTENTION DEFICIT HYPERACTIVITY DISORDER (ADHD), COMBINED TYPE: ICD-10-CM

## 2022-05-20 NOTE — TELEPHONE ENCOUNTER
Rx Refill Note  Requested Prescriptions     Pending Prescriptions Disp Refills   • lisdexamfetamine (VYVANSE) 30 MG capsule 60 capsule 0     Sig: Take 1 capsule by mouth 2 (Two) Times a Day      Last office visit with prescribing clinician: 2/15/2022      Next office visit with prescribing clinician: 5/25/2022            Herminia Gaines MA/LMR  05/20/22, 17:20 EDT

## 2022-05-25 ENCOUNTER — OFFICE VISIT (OUTPATIENT)
Dept: FAMILY MEDICINE CLINIC | Facility: CLINIC | Age: 42
End: 2022-05-25

## 2022-05-25 VITALS
WEIGHT: 149.2 LBS | HEIGHT: 61 IN | OXYGEN SATURATION: 99 % | SYSTOLIC BLOOD PRESSURE: 90 MMHG | HEART RATE: 82 BPM | BODY MASS INDEX: 28.17 KG/M2 | TEMPERATURE: 97.7 F | DIASTOLIC BLOOD PRESSURE: 60 MMHG

## 2022-05-25 DIAGNOSIS — F90.2 ATTENTION DEFICIT HYPERACTIVITY DISORDER (ADHD), COMBINED TYPE: Primary | ICD-10-CM

## 2022-05-25 DIAGNOSIS — R79.89 ELEVATED LIVER FUNCTION TESTS: ICD-10-CM

## 2022-05-25 DIAGNOSIS — F41.9 ANXIETY: ICD-10-CM

## 2022-05-25 DIAGNOSIS — Z00.8 ENCOUNTER FOR BIOMETRIC SCREENING: ICD-10-CM

## 2022-05-25 PROCEDURE — 99214 OFFICE O/P EST MOD 30 MIN: CPT | Performed by: FAMILY MEDICINE

## 2022-05-25 RX ORDER — FEXOFENADINE HCL 180 MG/1
180 TABLET ORAL DAILY
COMMUNITY

## 2022-05-25 NOTE — PROGRESS NOTES
"Chief Complaint  ADHD (MED CHECK REFILLS WOULD ALSO LIKE BIOMETRIC FORM FILLED OUT) and Elevated Hepatic Enzymes (PATIENT HAD A BAD STOMACH BUG)     3-month follow-up on ADHD, recent biometric labs, and elevated LFTs    LOV with me in February for chronic med refills and complaints of fatigue.  -- Basic fatigue labs were done and all WNL.    Overall, has been doing fine.  However, she did have \"a GI bug\" last week when she came for her blood work for her biometric labs.  Doing much better now.  Although she did have approximately a 5 to 7-day history of some upset stomach, diarrhea, and nausea.  All symptoms finally resolved about 2 days ago.  She has lost 6 pounds since last visit, likely related to her GI bug last week.  She recently has been starting to improve upon a healthier diet and regular exercise.  Sleeping fine.  Mood stable.    No complaints today.  She did have fasting lab work done last week, for her biometrics.  Request biometric forms to be completed today.  Compliant with and tolerates all medications without side effects.  Still takes her Vyvanse 30 mg twice daily for chronic ADD and Lexapro 10 mg daily for chronic AJ.  No change in medications.    Review of Systems   Constitutional: Negative for fatigue, fever and unexpected weight change.   Respiratory: Negative for cough and shortness of breath.    Cardiovascular: Negative for chest pain.        Subjective          Nely Gonzales presents to [facility]    Objective   Vital Signs:   Vitals:    05/25/22 1145   BP: 90/60   BP Location: Left arm   Patient Position: Sitting   Cuff Size: Adult   Pulse: 82   Temp: 97.7 °F (36.5 °C)   SpO2: 99%   Weight: 67.7 kg (149 lb 3.2 oz)   Height: 154.9 cm (61\")      Physical Exam  Vitals and nursing note reviewed.   Constitutional:       Appearance: Normal appearance. She is well-developed.   HENT:      Head: Normocephalic and atraumatic.      Nose: Nose normal.   Eyes:      " Conjunctiva/sclera: Conjunctivae normal.      Pupils: Pupils are equal, round, and reactive to light.   Neck:      Thyroid: No thyromegaly.   Cardiovascular:      Rate and Rhythm: Normal rate and regular rhythm.      Heart sounds: Normal heart sounds. No murmur heard.  Pulmonary:      Effort: Pulmonary effort is normal.      Breath sounds: Normal breath sounds.   Abdominal:      General: Abdomen is flat. Bowel sounds are normal. There is no distension.      Palpations: Abdomen is soft. There is no hepatomegaly, splenomegaly or mass.      Tenderness: There is no abdominal tenderness. There is no guarding or rebound.      Hernia: No hernia is present.   Musculoskeletal:         General: Normal range of motion.      Cervical back: Normal range of motion and neck supple.      Right lower leg: No edema.      Left lower leg: No edema.   Lymphadenopathy:      Cervical: No cervical adenopathy.   Skin:     General: Skin is warm.   Neurological:      General: No focal deficit present.      Mental Status: She is alert.   Psychiatric:         Mood and Affect: Mood normal.         Behavior: Behavior normal.         Thought Content: Thought content normal.         Judgment: Judgment normal.        Result Review :     Common labs    Common Labsle 7/26/21 7/26/21 7/26/21 2/15/22 5/19/22 5/19/22    1027 1027 1027  0936 0936   Glucose  77   88    BUN  11   8    Creatinine  0.80   0.73    eGFR Non  Am  79       eGFR African Am  96       Sodium  143   142    Potassium  4.5   3.5    Chloride  106   101    Calcium  9.2   8.9    Total Protein  6.8   6.5    Albumin  4.50   4.1    Total Bilirubin  0.4   0.2    Alkaline Phosphatase  33 (A)   42 (A)    AST (SGOT)  20   39    ALT (SGPT)  19   94 (A)    WBC 7.17   6.8     Hemoglobin 13.7   13.2     Hematocrit 40.2   39.5     Platelets 362   352     Total Cholesterol   193   153   Triglycerides   81   63   HDL Cholesterol   59   40   LDL Cholesterol    119 (A)   100 (A)   (A) Abnormal  value       Comments are available for some flowsheets but are not being displayed.           TSH    TSH 7/26/21 2/15/22   TSH 1.720 1.350                   Lab Results   Component Value Date    FERRITIN 86 02/15/2022    FOLATE 17.5 02/15/2022               Assessment and Plan    Diagnoses and all orders for this visit:    1. Attention deficit hyperactivity disorder (ADHD), combined type (Primary)  -controlled.  Urine Tox screen/February and Gopi report both reviewed, appropriate.  No change of medication.  May continue Vyvanse as prescribed, next refill will be due around 6/20/2022.    2. Anxiety  -controlled.  Continue Lexapro 10 mg daily, will refill upon request    3. Elevated liver function tests  -new Dx.  Asymptomatic.  Likely this was secondary to viral gastroenteritis last week?  Clinically much improved.  Also may be related to some weight gain during the last several months.  Plan to recheck LFTs in approximately 4 to 6 weeks, order placed.  Needs low-carb/low calorie/low cholesterol diet and increase cardio exercise to greater than 150 minutes weekly  -     Hepatic Function Panel; Future    4. Encounter for biometric screening  -biometric screening forms completed  Needs low-carb/low calorie/low cholesterol diet and increase cardio exercise to greater than 150 minutes weekly--- weight loss needed        Follow Up   Return in about 3 months (around 8/25/2022) for Annual physical, also needs lab appointment in 4 weeks.  Patient was given instructions and counseling regarding her condition or for health maintenance advice. Please see specific information pulled into the AVS if appropriate.

## 2022-05-25 NOTE — PATIENT INSTRUCTIONS
Recommend low fat/low calorie diet and exercise greater than 150 minutes of cardio per week.      Continue current treatment plan.     Please get repeat labs in 4 weeks, order placed

## 2022-05-31 DIAGNOSIS — R79.89 ELEVATED LIVER FUNCTION TESTS: ICD-10-CM

## 2022-06-22 DIAGNOSIS — F90.2 ATTENTION DEFICIT HYPERACTIVITY DISORDER (ADHD), COMBINED TYPE: ICD-10-CM

## 2022-06-23 NOTE — TELEPHONE ENCOUNTER
Rx Refill Note  Requested Prescriptions     Pending Prescriptions Disp Refills   • lisdexamfetamine (VYVANSE) 30 MG capsule 60 capsule 0     Sig: Take 1 capsule by mouth 2 (Two) Times a Day      Last office visit with prescribing clinician: 5/25/2022      Next office visit with prescribing clinician: 8/29/2022            Rocael Varela MA  06/23/22, 07:37 EDT

## 2022-06-25 LAB
ALBUMIN SERPL-MCNC: 4.8 G/DL (ref 3.8–4.8)
ALP SERPL-CCNC: 57 IU/L (ref 44–121)
ALT SERPL-CCNC: 78 IU/L (ref 0–32)
AST SERPL-CCNC: 37 IU/L (ref 0–40)
BILIRUB DIRECT SERPL-MCNC: 0.11 MG/DL (ref 0–0.4)
BILIRUB SERPL-MCNC: 0.3 MG/DL (ref 0–1.2)
PROT SERPL-MCNC: 7.1 G/DL (ref 6–8.5)

## 2022-06-29 DIAGNOSIS — R79.89 ELEVATED LIVER FUNCTION TESTS: Primary | ICD-10-CM

## 2022-07-11 ENCOUNTER — HOSPITAL ENCOUNTER (OUTPATIENT)
Dept: ULTRASOUND IMAGING | Facility: HOSPITAL | Age: 42
Discharge: HOME | End: 2022-07-11
Admitting: FAMILY MEDICINE

## 2022-07-11 DIAGNOSIS — R79.89 ELEVATED LIVER FUNCTION TESTS: ICD-10-CM

## 2022-07-11 PROCEDURE — 76705 ECHO EXAM OF ABDOMEN: CPT

## 2022-07-28 DIAGNOSIS — F90.2 ATTENTION DEFICIT HYPERACTIVITY DISORDER (ADHD), COMBINED TYPE: ICD-10-CM

## 2022-07-29 NOTE — TELEPHONE ENCOUNTER
Rx Refill Note  Requested Prescriptions     Pending Prescriptions Disp Refills   • lisdexamfetamine (VYVANSE) 30 MG capsule 60 capsule 0     Sig: Take 1 capsule by mouth 2 (Two) Times a Day      Last office visit with prescribing clinician: 5/25/2022      Next office visit with prescribing clinician: 8/29/2022            Rocael Varela MA  07/29/22, 07:56 EDT

## 2022-08-29 ENCOUNTER — OFFICE VISIT (OUTPATIENT)
Dept: FAMILY MEDICINE CLINIC | Facility: CLINIC | Age: 42
End: 2022-08-29

## 2022-08-29 VITALS
HEART RATE: 86 BPM | TEMPERATURE: 97.7 F | HEIGHT: 61 IN | SYSTOLIC BLOOD PRESSURE: 90 MMHG | DIASTOLIC BLOOD PRESSURE: 60 MMHG | OXYGEN SATURATION: 99 % | BODY MASS INDEX: 27.3 KG/M2 | WEIGHT: 144.6 LBS

## 2022-08-29 DIAGNOSIS — F90.2 ATTENTION DEFICIT HYPERACTIVITY DISORDER (ADHD), COMBINED TYPE: Primary | ICD-10-CM

## 2022-08-29 DIAGNOSIS — R79.89 ELEVATED LIVER FUNCTION TESTS: ICD-10-CM

## 2022-08-29 PROCEDURE — 99214 OFFICE O/P EST MOD 30 MIN: CPT | Performed by: FAMILY MEDICINE

## 2022-08-29 NOTE — PATIENT INSTRUCTIONS
Recommend low fat/low calorie diet and exercise greater than 150 minutes of cardio per week.      Continue current treatment plan.     Further follow-up based on labs

## 2022-08-29 NOTE — PROGRESS NOTES
"Chief Complaint  ADHD (3 MONTHS CHECK UP/Follow-up on abnormal LFTs)     3-month follow-up for ADHD and elevated liver function test    LOV with for chronic med refills and new onset elevation of liver function test.  -- Suspected her LFT elevation was due to either her recent viral illness, gallbladder/gallstones, and/or weight gain??  -- Liver ultrasound ordered.  Asked to improve upon diet and exercise.  -- LFTs were rechecked approximately 4 weeks later and improved, although still elevated.  -- Also, at last visit biometric forms were updated.    Overall, continues to do well.  She has been successful with some weight loss, lost 5 pounds since last visit.  No further episodes of nausea, abdominal discomfort, or diarrhea.  That 1 particular illness only lasted about 5 to 7 days.     No particular complaints or concerns today.  Needs chronic med refills.  Compliant with and tolerates all medications without side effects.  Still takes her Vyvanse 30 mg twice daily, pretty much every day.  No chest pain, SOA, or palpitations.      Review of Systems   Constitutional: Negative for fever and unexpected weight change.   Respiratory: Negative for cough and shortness of breath.    Cardiovascular: Negative for chest pain.        Subjective          Nely Gonzales presents to [facility]    Objective   Vital Signs:   Vitals:    08/29/22 0940   BP: 90/60   BP Location: Left arm   Patient Position: Sitting   Cuff Size: Adult   Pulse: 86   Temp: 97.7 °F (36.5 °C)   SpO2: 99%   Weight: 65.6 kg (144 lb 9.6 oz)   Height: 154.9 cm (61\")      Body mass index is 27.32 kg/m².   Physical Exam  Vitals and nursing note reviewed.   Constitutional:       Appearance: Normal appearance. She is well-developed.   HENT:      Head: Normocephalic and atraumatic.      Nose: Nose normal.   Eyes:      Conjunctiva/sclera: Conjunctivae normal.      Pupils: Pupils are equal, round, and reactive to light.   Neck:      " Thyroid: No thyromegaly.   Cardiovascular:      Rate and Rhythm: Normal rate and regular rhythm.      Heart sounds: Normal heart sounds. No murmur heard.  Pulmonary:      Effort: Pulmonary effort is normal.      Breath sounds: Normal breath sounds.   Abdominal:      General: Abdomen is flat. Bowel sounds are normal. There is no distension.      Palpations: Abdomen is soft. There is no hepatomegaly, splenomegaly or mass.      Tenderness: There is no abdominal tenderness. There is no guarding or rebound.      Hernia: No hernia is present.   Musculoskeletal:         General: Normal range of motion.      Cervical back: Normal range of motion and neck supple.      Right lower leg: No edema.      Left lower leg: No edema.   Lymphadenopathy:      Cervical: No cervical adenopathy.   Skin:     General: Skin is warm.   Neurological:      General: No focal deficit present.      Mental Status: She is alert.   Psychiatric:         Mood and Affect: Mood normal.         Behavior: Behavior normal.         Thought Content: Thought content normal.         Judgment: Judgment normal.        Result Review :     Common labs    Common Labsle 2/15/22 5/19/22 5/19/22 6/24/22     0936 0936    Glucose  88     BUN  8     Creatinine  0.73     Sodium  142     Potassium  3.5     Chloride  101     Calcium  8.9     Total Protein  6.5  7.1   Albumin  4.1  4.8   Total Bilirubin  0.2  0.3   Alkaline Phosphatase  42 (A)  57   AST (SGOT)  39  37   ALT (SGPT)  94 (A)  78 (A)   WBC 6.8      Hemoglobin 13.2      Hematocrit 39.5      Platelets 352      Total Cholesterol   153    Triglycerides   63    HDL Cholesterol   40    LDL Cholesterol    100 (A)    (A) Abnormal value            TSH    TSH 2/15/22   TSH 1.350               Lab Results   Component Value Date    FERRITIN 86 02/15/2022    FOLATE 17.5 02/15/2022      US Liver (07/11/2022 09:58)           Assessment and Plan    Diagnoses and all orders for this visit:    1. Attention deficit hyperactivity  disorder (ADHD), combined type (Primary)  -controlled  Urine Tox screen/February and Gopi report both reviewed, appropriate.  No change of medication.  Continue/refill Vyvanse as directed below, refill due today.  -     lisdexamfetamine (VYVANSE) 30 MG capsule; Take 1 capsule by mouth 2 (Two) Times a Day  Dispense: 60 capsule; Refill: 0    2. Elevated liver function tests  -remains uncontrolled, although improved.  Asymptomatic.  Need to recheck LFTs today.  If resolved then no further work-up needed.  Otherwise, may need to order HIDA scan --possible gallbladder dysfunction?  Suspect she had a viral illness and/or weight gain related?  S/P liver ultrasound completely negative.  -     Hepatic Function Panel        Follow Up   Return in about 3 months (around 11/29/2022) for Annual physical, Recheck, okay 15-minute wellness.  Patient was given instructions and counseling regarding her condition or for health maintenance advice. Please see specific information pulled into the AVS if appropriate.       Answers for HPI/ROS submitted by the patient on 8/28/2022  Please describe your symptoms.: Medication check  Have you had these symptoms before?: Yes  How long have you been having these symptoms?: Greater than 2 weeks  Please list any medications you are currently taking for this condition.: Vyvanse 30mg BID  Please describe any probable cause for these symptoms. : Adult ADHD  What is the primary reason for your visit?: Other

## 2022-08-30 LAB
ALBUMIN SERPL-MCNC: 4.6 G/DL (ref 3.8–4.8)
ALP SERPL-CCNC: 56 IU/L (ref 44–121)
ALT SERPL-CCNC: 31 IU/L (ref 0–32)
AST SERPL-CCNC: 25 IU/L (ref 0–40)
BILIRUB DIRECT SERPL-MCNC: 0.12 MG/DL (ref 0–0.4)
BILIRUB SERPL-MCNC: 0.4 MG/DL (ref 0–1.2)
PROT SERPL-MCNC: 7 G/DL (ref 6–8.5)

## 2022-10-06 DIAGNOSIS — F90.2 ATTENTION DEFICIT HYPERACTIVITY DISORDER (ADHD), COMBINED TYPE: ICD-10-CM

## 2022-10-06 NOTE — TELEPHONE ENCOUNTER
Rx Refill Note  Requested Prescriptions     Pending Prescriptions Disp Refills   • lisdexamfetamine (VYVANSE) 30 MG capsule 60 capsule 0     Sig: Take 1 capsule by mouth 2 (Two) Times a Day      Last office visit with prescribing clinician: 8/29/2022      Next office visit with prescribing clinician: 11/30/2022            Rocael Varela MA  10/06/22, 08:03 EDT

## 2022-11-11 DIAGNOSIS — F90.2 ATTENTION DEFICIT HYPERACTIVITY DISORDER (ADHD), COMBINED TYPE: ICD-10-CM

## 2022-11-11 NOTE — TELEPHONE ENCOUNTER
Rx Refill Note  Requested Prescriptions     Pending Prescriptions Disp Refills   • lisdexamfetamine (VYVANSE) 30 MG capsule 60 capsule 0     Sig: Take 1 capsule by mouth 2 (Two) Times a Day      Last office visit with prescribing clinician: 8/29/2022      Next office visit with prescribing clinician: 11/30/2022            Rocael Varela MA  11/11/22, 08:03 EST

## 2022-11-30 ENCOUNTER — OFFICE VISIT (OUTPATIENT)
Dept: FAMILY MEDICINE CLINIC | Facility: CLINIC | Age: 42
End: 2022-11-30

## 2022-11-30 VITALS
SYSTOLIC BLOOD PRESSURE: 100 MMHG | DIASTOLIC BLOOD PRESSURE: 60 MMHG | HEIGHT: 61 IN | TEMPERATURE: 97.7 F | HEART RATE: 76 BPM | OXYGEN SATURATION: 97 % | WEIGHT: 144.6 LBS | BODY MASS INDEX: 27.3 KG/M2

## 2022-11-30 DIAGNOSIS — R79.89 ELEVATED LIVER FUNCTION TESTS: ICD-10-CM

## 2022-11-30 DIAGNOSIS — F41.9 ANXIETY: ICD-10-CM

## 2022-11-30 DIAGNOSIS — Z00.00 WELLNESS EXAMINATION: Primary | ICD-10-CM

## 2022-11-30 DIAGNOSIS — F90.2 ATTENTION DEFICIT HYPERACTIVITY DISORDER (ADHD), COMBINED TYPE: ICD-10-CM

## 2022-11-30 PROBLEM — Z00.8 ENCOUNTER FOR BIOMETRIC SCREENING: Status: ACTIVE | Noted: 2022-11-30

## 2022-11-30 PROCEDURE — 99396 PREV VISIT EST AGE 40-64: CPT | Performed by: FAMILY MEDICINE

## 2022-11-30 NOTE — PATIENT INSTRUCTIONS
Recommend low fat/low calorie diet and exercise greater than 150 minutes of cardio per week.    Continue current treatment plan.

## 2022-11-30 NOTE — PROGRESS NOTES
Chief Complaint  Annual Exam (Yearly wellness biometric screening has GYN for female wellness) and ADHD     PRESENTS FOR ANNUAL WELLNESS EXAM  And  3-month follow-up for ADHD, AJ, and elevated LFTs    LOV with me in August for chronic med refills and follow-up on elevated liver function test  -- No med changes made.  -- Repeat LFTs, all back to normal.  Work-up negative.    Overall, continues to do well.  Still working full-time, does 12-hour shifts.  Home life going well, new puppy/camacho retriever keeping her active.  Tries to maintain a healthy low-cholesterol diet and regular cardio exercise.  Weight remains stable.  She has successfully lost about 10 pounds in the last 6 months, but stable from last visit.  Sleeping fine.  Mood good.  Denies chest pain, palpitations, SOA.    No new concerns or complaints today.  Needs chronic med refills.  All labs up-to-date.  Compliant with and tolerates all medications without side effects.  Still takes her Vyvanse 30 mg BID on all working days, may only take it once a day if not working.    Routine health maintenance/screening test:  PAP --- has gynecologist, up-to-date  MAMMO --- done at gynecologist office, 2022, negative  Vaccines --- all up-to-date except for COVID booster  Smoking/ETOH Status --- non-smoker.  Social alcohol only  Dentist, Eye Exam, Derm --- maintains regular dental visits, overdue for eye exam.  Has dermatologist  Diet/Exercise --- maintains a healthy low-cholesterol diet and regular cardio exercise  Pertinent FH --- significant for HTN/parents, negative for premature CAD, colon cancer, breast cancer.  Son with autism    Review of Systems   Constitutional: Negative for fever and unexpected weight change.   Respiratory: Negative for cough and shortness of breath.    Cardiovascular: Negative for chest pain.        Subjective          Nely Gonzales presents to [facility]    Objective   Vital Signs:   Vitals:    11/30/22  "0945   BP: 100/60   BP Location: Left arm   Patient Position: Sitting   Cuff Size: Adult   Pulse: 76   Temp: 97.7 °F (36.5 °C)   SpO2: 97%   Weight: 65.6 kg (144 lb 9.6 oz)   Height: 154.9 cm (61\")      Body mass index is 27.32 kg/m².   Physical Exam  Vitals and nursing note reviewed.   Constitutional:       Appearance: Normal appearance. She is well-developed.   HENT:      Head: Normocephalic and atraumatic.      Nose: Nose normal.   Eyes:      Conjunctiva/sclera: Conjunctivae normal.      Pupils: Pupils are equal, round, and reactive to light.   Neck:      Thyroid: No thyromegaly.   Cardiovascular:      Rate and Rhythm: Normal rate and regular rhythm.      Heart sounds: Normal heart sounds. No murmur heard.  Pulmonary:      Effort: Pulmonary effort is normal.      Breath sounds: Normal breath sounds.   Abdominal:      General: Abdomen is flat. Bowel sounds are normal. There is no distension.      Palpations: Abdomen is soft. There is no hepatomegaly, splenomegaly or mass.      Tenderness: There is no abdominal tenderness. There is no guarding or rebound.      Hernia: No hernia is present.   Musculoskeletal:         General: Normal range of motion.      Cervical back: Normal range of motion and neck supple.      Right lower leg: No edema.      Left lower leg: No edema.   Lymphadenopathy:      Cervical: No cervical adenopathy.   Skin:     General: Skin is warm.   Neurological:      General: No focal deficit present.      Mental Status: She is alert.   Psychiatric:         Mood and Affect: Mood normal.         Behavior: Behavior normal.         Thought Content: Thought content normal.         Judgment: Judgment normal.        Result Review :     Common labs    Common Labs 5/19/22 5/19/22 6/24/22 8/29/22    0936 0936     Glucose 88      BUN 8      Creatinine 0.73      Sodium 142      Potassium 3.5      Chloride 101      Calcium 8.9      Total Protein 6.5  7.1 7.0   Albumin 4.1  4.8 4.6   Total Bilirubin 0.2  0.3 0.4 "   Alkaline Phosphatase 42 (A)  57 56   AST (SGOT) 39  37 25   ALT (SGPT) 94 (A)  78 (A) 31   Total Cholesterol  153     Triglycerides  63     HDL Cholesterol  40     LDL Cholesterol   100 (A)     (A) Abnormal value            TSH    TSH 2/15/22   TSH 1.350             February 2022 --CBC normal, hep C antibody negative      Lab Results   Component Value Date    FERRITIN 86 02/15/2022    FOLATE 17.5 02/15/2022               Assessment and Plan    Diagnoses and all orders for this visit:    1. Wellness examination (Primary)  -within normal limits  Has gynecologist, all screening up-to-date.  Have requested a copy of her mammogram.  She does need the COVID booster, will consider getting from the pharmacy in near future.  Otherwise, continue to improve upon healthy lifestyle --Needs low-carb/low calorie/low cholesterol diet and increase cardio exercise to greater than 150 minutes weekly    2. Attention deficit hyperactivity disorder (ADHD), combined type  -controlled  Urine Tox screen/February and Gopi report both reviewed, appropriate.  No change with medication at this time.  However, may consider increasing the dose of Vyvanse and only taking once a day at next visit?  May refill Vyvanse as directed below, next refill due on 12/13/2022  -     lisdexamfetamine (VYVANSE) 30 MG capsule; Take 1 capsule by mouth 2 (Two) Times a Day  Dispense: 60 capsule; Refill: 0    3. Anxiety  -controlled on Lexapro 10 mg daily    4. Elevated liver function tests  -resolved.  Work-up negative.  Asymptomatic.  Most likely this was due to weight gain and viral illness.      Next visit --- May be due for annual labs for her biometric screening/program through her employer.   will also be due for update on Urine Tox screen and controlled medicine contract.      Follow Up   Return in about 3 months (around 2/28/2023) for Recheck.  Patient was given instructions and counseling regarding her condition or for health maintenance advice. Please  see specific information pulled into the AVS if appropriate.

## 2022-12-22 DIAGNOSIS — F90.2 ATTENTION DEFICIT HYPERACTIVITY DISORDER (ADHD), COMBINED TYPE: ICD-10-CM

## 2023-01-27 DIAGNOSIS — F90.2 ATTENTION DEFICIT HYPERACTIVITY DISORDER (ADHD), COMBINED TYPE: ICD-10-CM

## 2023-01-27 NOTE — TELEPHONE ENCOUNTER
Rx Refill Note  Requested Prescriptions     Pending Prescriptions Disp Refills   • lisdexamfetamine (VYVANSE) 30 MG capsule 60 capsule 0     Sig: Take 1 capsule by mouth 2 (Two) Times a Day      Last office visit with prescribing clinician: 11/30/2022   Last telemedicine visit with prescribing clinician: 3/1/2023   Next office visit with prescribing clinician: 3/1/2023                         Would you like a call back once the refill request has been completed: [] Yes [] No    If the office needs to give you a call back, can they leave a voicemail: [] Yes [] No    Rocael Varela MA  01/27/23, 08:01 EST

## 2023-02-08 DIAGNOSIS — F90.2 ATTENTION DEFICIT HYPERACTIVITY DISORDER (ADHD), COMBINED TYPE: ICD-10-CM

## 2023-02-08 NOTE — TELEPHONE ENCOUNTER
Rx Refill Note  Requested Prescriptions     Pending Prescriptions Disp Refills   • lisdexamfetamine (VYVANSE) 30 MG capsule 60 capsule 0     Sig: Take 1 capsule by mouth 2 (Two) Times a Day      Last office visit with prescribing clinician: 11/30/2022   Last telemedicine visit with prescribing clinician: 3/1/2023   Next office visit with prescribing clinician: 3/1/2023                         Would you like a call back once the refill request has been completed: [] Yes [] No    If the office needs to give you a call back, can they leave a voicemail: [] Yes [] No    Rocael Varela MA  02/08/23, 09:53 EST

## 2023-02-10 ENCOUNTER — TELEPHONE (OUTPATIENT)
Dept: FAMILY MEDICINE CLINIC | Facility: CLINIC | Age: 43
End: 2023-02-10
Payer: COMMERCIAL

## 2023-02-10 NOTE — TELEPHONE ENCOUNTER
----- Message from Nely Gonzales sent at 2/10/2023 10:00 AM EST -----  Regarding: meds  Contact: [phone]  I talked to the pharmacist to see if we could fill it without insurance this time because I know Dr Dong wanted me to come in to discuss changing to once daily dosing, however it will be $650 to fill the vyvanse 30mg BID PRN 45 count. Is there any way she can increase to the dose once daily so they can fill it and I can schedule an appointment to come in to see her sooner if needed?

## 2023-02-10 NOTE — TELEPHONE ENCOUNTER
Does come in 10 20 30 40 50 60 and 70 mgs her insurance will not cover the BID dosing can you please increase dose for once daily dosing and she will follow up

## 2023-02-10 NOTE — TELEPHONE ENCOUNTER
Rx Refill Note  Requested Prescriptions     Pending Prescriptions Disp Refills   • lisdexamfetamine (Vyvanse) 60 MG capsule 30 capsule 0     Sig: Take 1 capsule by mouth Every Morning      Last office visit with prescribing clinician: 11/30/2022   Last telemedicine visit with prescribing clinician: 3/1/2023   Next office visit with prescribing clinician: 3/1/2023                         Would you like a call back once the refill request has been completed: [] Yes [] No    If the office needs to give you a call back, can they leave a voicemail: [] Yes [] No    Rocael Varela MA  02/10/23, 16:10 EST

## 2023-03-01 ENCOUNTER — OFFICE VISIT (OUTPATIENT)
Dept: FAMILY MEDICINE CLINIC | Facility: CLINIC | Age: 43
End: 2023-03-01
Payer: COMMERCIAL

## 2023-03-01 VITALS
HEART RATE: 84 BPM | WEIGHT: 143.8 LBS | TEMPERATURE: 97.5 F | SYSTOLIC BLOOD PRESSURE: 94 MMHG | DIASTOLIC BLOOD PRESSURE: 62 MMHG | HEIGHT: 61 IN | OXYGEN SATURATION: 99 % | BODY MASS INDEX: 27.15 KG/M2

## 2023-03-01 DIAGNOSIS — F41.9 ANXIETY: ICD-10-CM

## 2023-03-01 DIAGNOSIS — F90.2 ATTENTION DEFICIT HYPERACTIVITY DISORDER (ADHD), COMBINED TYPE: ICD-10-CM

## 2023-03-01 DIAGNOSIS — Z00.8 ENCOUNTER FOR BIOMETRIC SCREENING: Primary | ICD-10-CM

## 2023-03-01 DIAGNOSIS — Z79.899 HIGH RISK MEDICATION USE: ICD-10-CM

## 2023-03-01 DIAGNOSIS — R25.2 CRAMPS, MUSCLE, GENERAL: ICD-10-CM

## 2023-03-01 PROCEDURE — 99214 OFFICE O/P EST MOD 30 MIN: CPT | Performed by: FAMILY MEDICINE

## 2023-03-01 NOTE — PROGRESS NOTES
"Chief Complaint  ADHD (CHECK UP FASTING LABS AND BIOMETRIC SCREENING FORM COMPLETED)     3-month follow-up for ADHD, anxiety, and complaints of some general muscle tension  And  Needs biometric screening labs/forms    LOV with me in November for wellness exam and chronic med refills.  -- Screening test all updated, no labs were done at that visit due to needing fasting labs for biometric wellness in early 2023.  -- Her Vyvanse was changed from 30 mg twice daily to  60 mg daily due to insurance reasons.    Overall, continues to do well.  Work and family life is going fine.  The change in Vyvanse dosing seems to be working fine.  For the most part, she does take her Vyvanse on a daily basis and tolerates without side effects.  No chest pain, SOA, palpitations.  Weight remains stable.    Needs biometric forms completed today.  She is fasting.  Tries to maintain a healthy low-cholesterol/low-carb diet and regular cardio exercise.    Her only complaint today is some generalized muscle tension/cramps over the last few months.  Requesting to have additional lab work done, check magnesium?  She did start a magnesium supplement recently.  She is trying to do this to help relax her muscles at night and therefore help improve her sleep.  Takes no diuretics.    Otherwise, needs chronic med refills.  Compliant with and tolerates all medications without side effects.    Review of Systems   Constitutional: Negative for fever and unexpected weight change.   Respiratory: Negative for cough and shortness of breath.    Cardiovascular: Negative for chest pain.        Subjective          Nely Gonzales presents to [facility]    Objective   Vital Signs:   Vitals:    03/01/23 0905   BP: 94/62   BP Location: Left arm   Patient Position: Sitting   Cuff Size: Adult   Pulse: 84   Temp: 97.5 °F (36.4 °C)   SpO2: 99%   Weight: 65.2 kg (143 lb 12.8 oz)   Height: 154.9 cm (61\")      Body mass index is 27.17 kg/m². "   Physical Exam  Vitals and nursing note reviewed.   Constitutional:       Appearance: Normal appearance. She is well-developed.   HENT:      Head: Normocephalic and atraumatic.      Nose: Nose normal.   Eyes:      Conjunctiva/sclera: Conjunctivae normal.      Pupils: Pupils are equal, round, and reactive to light.   Neck:      Thyroid: No thyromegaly.   Cardiovascular:      Rate and Rhythm: Normal rate and regular rhythm.      Heart sounds: Normal heart sounds. No murmur heard.  Pulmonary:      Effort: Pulmonary effort is normal.      Breath sounds: Normal breath sounds.   Abdominal:      General: Abdomen is flat. Bowel sounds are normal. There is no distension.      Palpations: Abdomen is soft. There is no hepatomegaly, splenomegaly or mass.      Tenderness: There is no abdominal tenderness. There is no guarding or rebound.      Hernia: No hernia is present.   Musculoskeletal:         General: Normal range of motion.      Cervical back: Normal range of motion and neck supple.      Right lower leg: No edema.      Left lower leg: No edema.   Lymphadenopathy:      Cervical: No cervical adenopathy.   Skin:     General: Skin is warm.   Neurological:      General: No focal deficit present.      Mental Status: She is alert.   Psychiatric:         Mood and Affect: Mood normal.         Behavior: Behavior normal.         Thought Content: Thought content normal.         Judgment: Judgment normal.        Result Review :     Common labs    Common Labs 5/19/22 5/19/22 6/24/22 8/29/22    0936 0936     Glucose 88      BUN 8      Creatinine 0.73      Sodium 142      Potassium 3.5      Chloride 101      Calcium 8.9      Total Protein 6.5  7.1 7.0   Albumin 4.1  4.8 4.6   Total Bilirubin 0.2  0.3 0.4   Alkaline Phosphatase 42 (A)  57 56   AST (SGOT) 39  37 25   ALT (SGPT) 94 (A)  78 (A) 31   Total Cholesterol  153     Triglycerides  63     HDL Cholesterol  40     LDL Cholesterol   100 (A)     (A) Abnormal value                         Lab Results   Component Value Date    FERRITIN 86 02/15/2022    FOLATE 17.5 02/15/2022               Assessment and Plan    Diagnoses and all orders for this visit:    1. Encounter for biometric screening (Primary)  -all biometric parameters have been checked and completed.  Labs to be performed as listed below, paperwork/form completed.  Continue with healthy lifestyle --Needs low-carb/low calorie/low cholesterol diet and increase cardio exercise to greater than 150 minutes weekly  -     CBC & Differential  -     Basic Metabolic Panel  -     Lipid Panel With LDL / HDL Ratio    2. Attention deficit hyperactivity disorder (ADHD), combined type  --controlled  Due to update yearly Urine Tox screen and controlled substance contract today.  Gopi report reviewed, appropriate.  Low risk patient behavior.  Doing fine with change in Vyvanse from 30 mg BID dosing to daily dosing of 60 mg daily.  Plan to refill Vyvanse 60 mg daily, next refill due on 3/11/2023    3. Anxiety  -controlled  Plan to check yearly TSH today  Plan to continue Lexapro 10 mg daily  -     TSH    4. Cramps, muscle, general  -new Dx, mild.  Check CBC, magnesium, BMP  May continue with OTC magnesium supplement provided labs all WNL.  Recommend good hydration, good sleep hygiene, and regular exercise  -     CBC & Differential  -     Magnesium    5. High risk medication use  -     lisdexamfetamine (Vyvanse) 60 MG capsule; Take 1 capsule by mouth Every Morning  Dispense: 30 capsule; Refill: 0  -     ToxASSURE Select 13 Discrete -        Follow Up   Return in about 3 months (around 6/1/2023) for Recheck.  Patient was given instructions and counseling regarding her condition or for health maintenance advice. Please see specific information pulled into the AVS if appropriate.

## 2023-03-07 LAB
6MAM UR QL CFM: NEGATIVE
6MAM/CREAT UR: NOT DETECTED NG/MG CREAT
7AMINOCLONAZEPAM/CREAT UR: NOT DETECTED NG/MG CREAT
A-OH ALPRAZ/CREAT UR: NOT DETECTED NG/MG CREAT
A-OH-TRIAZOLAM/CREAT UR CFM: NOT DETECTED NG/MG CREAT
ALFENTANIL/CREAT UR CFM: NOT DETECTED NG/MG CREAT
ALPHA-HYDROXYMIDAZOLAM, URINE: NOT DETECTED NG/MG CREAT
ALPRAZ/CREAT UR CFM: NOT DETECTED NG/MG CREAT
AMOBARBITAL UR QL CFM: NOT DETECTED
AMPHET/CREAT UR: 780 NG/MG CREAT
AMPHETAMINES UR QL CFM: NORMAL
BARBITAL UR QL CFM: NOT DETECTED
BARBITURATES UR QL CFM: NEGATIVE
BASOPHILS # BLD AUTO: 0.04 10*3/MM3 (ref 0–0.2)
BASOPHILS NFR BLD AUTO: 0.6 % (ref 0–1.5)
BENZODIAZ UR QL CFM: NEGATIVE
BUN SERPL-MCNC: 15 MG/DL (ref 6–20)
BUN/CREAT SERPL: 20.5 (ref 7–25)
BUPRENORPHINE UR QL CFM: NEGATIVE
BUPRENORPHINE/CREAT UR: NOT DETECTED NG/MG CREAT
BUTABARBITAL UR QL CFM: NOT DETECTED
BUTALBITAL UR QL CFM: NOT DETECTED
BZE/CREAT UR: NOT DETECTED NG/MG CREAT
CALCIUM SERPL-MCNC: 9.3 MG/DL (ref 8.6–10.5)
CANNABINOIDS UR QL CFM: NEGATIVE
CARBOXYTHC/CREAT UR: NOT DETECTED NG/MG CREAT
CHLORIDE SERPL-SCNC: 102 MMOL/L (ref 98–107)
CHOLEST SERPL-MCNC: 215 MG/DL (ref 0–200)
CLONAZEPAM/CREAT UR CFM: NOT DETECTED NG/MG CREAT
CO2 SERPL-SCNC: 26.4 MMOL/L (ref 22–29)
COCAETHYLENE/CREAT UR CFM: NOT DETECTED NG/MG CREAT
COCAINE UR QL CFM: NEGATIVE
COCAINE/CREAT UR CFM: NOT DETECTED NG/MG CREAT
CODEINE/CREAT UR: NOT DETECTED NG/MG CREAT
CREAT SERPL-MCNC: 0.73 MG/DL (ref 0.57–1)
CREAT UR-MCNC: 148 MG/DL
DESALKYLFLURAZ/CREAT UR: NOT DETECTED NG/MG CREAT
DESMETHYLFLUNITRAZEPAM: NOT DETECTED NG/MG CREAT
DHC/CREAT UR: NOT DETECTED NG/MG CREAT
DIAZEPAM/CREAT UR: NOT DETECTED NG/MG CREAT
DRUGS UR: NORMAL
EDDP/CREAT UR: NOT DETECTED NG/MG CREAT
EGFRCR SERPLBLD CKD-EPI 2021: 105.5 ML/MIN/1.73
EOSINOPHIL # BLD AUTO: 0.18 10*3/MM3 (ref 0–0.4)
EOSINOPHIL NFR BLD AUTO: 2.8 % (ref 0.3–6.2)
ERYTHROCYTE [DISTWIDTH] IN BLOOD BY AUTOMATED COUNT: 11.4 % (ref 12.3–15.4)
ETHANOL UR CFM-MCNC: NOT DETECTED G/DL
ETHANOL UR QL CFM: NEGATIVE
FENTANYL UR QL CFM: NEGATIVE
FENTANYL/CREAT UR: NOT DETECTED NG/MG CREAT
FLUNITRAZEPAM UR QL CFM: NOT DETECTED NG/MG CREAT
GLUCOSE SERPL-MCNC: 103 MG/DL (ref 65–99)
HCT VFR BLD AUTO: 39.3 % (ref 34–46.6)
HDLC SERPL-MCNC: 86 MG/DL (ref 40–60)
HGB BLD-MCNC: 13 G/DL (ref 12–15.9)
HYDROCODONE/CREAT UR: NOT DETECTED NG/MG CREAT
HYDROMORPHONE/CREAT UR: NOT DETECTED NG/MG CREAT
IMM GRANULOCYTES # BLD AUTO: 0.01 10*3/MM3 (ref 0–0.05)
IMM GRANULOCYTES NFR BLD AUTO: 0.2 % (ref 0–0.5)
LDLC SERPL CALC-MCNC: 122 MG/DL (ref 0–100)
LDLC/HDLC SERPL: 1.4 {RATIO}
LORAZEPAM/CREAT UR: NOT DETECTED NG/MG CREAT
LYMPHOCYTES # BLD AUTO: 2.43 10*3/MM3 (ref 0.7–3.1)
LYMPHOCYTES NFR BLD AUTO: 37.6 % (ref 19.6–45.3)
MAGNESIUM SERPL-MCNC: 2.3 MG/DL (ref 1.6–2.6)
MCH RBC QN AUTO: 29.2 PG (ref 26.6–33)
MCHC RBC AUTO-ENTMCNC: 33.1 G/DL (ref 31.5–35.7)
MCV RBC AUTO: 88.3 FL (ref 79–97)
MDA/CREAT UR: NOT DETECTED NG/MG CREAT
MDMA/CREAT UR: NOT DETECTED NG/MG CREAT
MEPHOBARBITAL UR QL CFM: NOT DETECTED
METHADONE UR QL CFM: NEGATIVE
METHADONE/CREAT UR: NOT DETECTED NG/MG CREAT
METHAMPHET/CREAT UR: NOT DETECTED NG/MG CREAT
MIDAZOLAM/CREAT UR CFM: NOT DETECTED NG/MG CREAT
MONOCYTES # BLD AUTO: 0.48 10*3/MM3 (ref 0.1–0.9)
MONOCYTES NFR BLD AUTO: 7.4 % (ref 5–12)
MORPHINE/CREAT UR: NOT DETECTED NG/MG CREAT
N-NORTRAMADOL/CREAT UR CFM: NOT DETECTED NG/MG CREAT
NARCOTICS UR: NEGATIVE
NEUTROPHILS # BLD AUTO: 3.33 10*3/MM3 (ref 1.7–7)
NEUTROPHILS NFR BLD AUTO: 51.4 % (ref 42.7–76)
NORBUPRENORPHINE/CREAT UR: NOT DETECTED NG/MG CREAT
NORCODEINE/CREAT UR CFM: NOT DETECTED NG/MG CREAT
NORDIAZEPAM/CREAT UR: NOT DETECTED NG/MG CREAT
NORFENTANYL/CREAT UR: NOT DETECTED NG/MG CREAT
NORHYDROCODONE/CREAT UR: NOT DETECTED NG/MG CREAT
NORMORPHINE UR-MCNC: NOT DETECTED NG/MG CREAT
NOROXYCODONE/CREAT UR: NOT DETECTED NG/MG CREAT
NOROXYMORPHONE/CREAT UR CFM: NOT DETECTED NG/MG CREAT
NRBC BLD AUTO-RTO: 0 /100 WBC (ref 0–0.2)
O-NORTRAMADOL UR CFM-MCNC: NOT DETECTED NG/MG CREAT
OPIATES UR QL CFM: NEGATIVE
OXAZEPAM/CREAT UR: NOT DETECTED NG/MG CREAT
OXYCODONE UR QL CFM: NEGATIVE
OXYCODONE/CREAT UR: NOT DETECTED NG/MG CREAT
OXYMORPHONE/CREAT UR: NOT DETECTED NG/MG CREAT
PENTOBARB UR QL CFM: NOT DETECTED
PHENOBARB UR QL CFM: NOT DETECTED
PLATELET # BLD AUTO: 320 10*3/MM3 (ref 140–450)
POTASSIUM SERPL-SCNC: 4.4 MMOL/L (ref 3.5–5.2)
RBC # BLD AUTO: 4.45 10*6/MM3 (ref 3.77–5.28)
SECOBARBITAL UR QL CFM: NOT DETECTED
SERVICE CMNT 02-IMP: NORMAL
SODIUM SERPL-SCNC: 139 MMOL/L (ref 136–145)
SUFENTANIL/CREAT UR CFM: NOT DETECTED NG/MG CREAT
TAPENTADOL UR QL CFM: NEGATIVE
TAPENTADOL/CREAT UR: NOT DETECTED NG/MG CREAT
TEMAZEPAM/CREAT UR: NOT DETECTED NG/MG CREAT
THIOPENTAL UR QL CFM: NOT DETECTED
TRAMADOL UR QL CFM: NOT DETECTED NG/MG CREAT
TRIGL SERPL-MCNC: 41 MG/DL (ref 0–150)
TSH SERPL DL<=0.005 MIU/L-ACNC: 1.63 UIU/ML (ref 0.27–4.2)
VLDLC SERPL CALC-MCNC: 7 MG/DL (ref 5–40)
WBC # BLD AUTO: 6.47 10*3/MM3 (ref 3.4–10.8)

## 2023-04-12 DIAGNOSIS — Z79.899 HIGH RISK MEDICATION USE: ICD-10-CM

## 2023-04-12 NOTE — TELEPHONE ENCOUNTER
Rx Refill Note  Requested Prescriptions     Pending Prescriptions Disp Refills   • lisdexamfetamine (Vyvanse) 60 MG capsule 30 capsule 0     Sig: Take 1 capsule by mouth Every Morning      Last office visit with prescribing clinician: 3/1/2023   Last telemedicine visit with prescribing clinician: 6/6/2023   Next office visit with prescribing clinician: 6/6/2023                         Would you like a call back once the refill request has been completed: [] Yes [] No    If the office needs to give you a call back, can they leave a voicemail: [] Yes [] No    Rocael Varela MA  04/12/23, 07:47 EDT

## 2023-04-21 RX ORDER — ESCITALOPRAM OXALATE 10 MG/1
TABLET ORAL
Qty: 90 TABLET | Refills: 1 | Status: SHIPPED | OUTPATIENT
Start: 2023-04-21

## 2023-05-17 DIAGNOSIS — Z79.899 HIGH RISK MEDICATION USE: ICD-10-CM

## 2023-06-06 ENCOUNTER — OFFICE VISIT (OUTPATIENT)
Dept: FAMILY MEDICINE CLINIC | Facility: CLINIC | Age: 43
End: 2023-06-06
Payer: COMMERCIAL

## 2023-06-06 VITALS
DIASTOLIC BLOOD PRESSURE: 66 MMHG | HEART RATE: 95 BPM | SYSTOLIC BLOOD PRESSURE: 110 MMHG | WEIGHT: 140 LBS | TEMPERATURE: 98.7 F | HEIGHT: 61 IN | OXYGEN SATURATION: 98 % | BODY MASS INDEX: 26.43 KG/M2

## 2023-06-06 DIAGNOSIS — F90.2 ATTENTION DEFICIT HYPERACTIVITY DISORDER (ADHD), COMBINED TYPE: Primary | ICD-10-CM

## 2023-06-06 DIAGNOSIS — Z79.899 HIGH RISK MEDICATION USE: ICD-10-CM

## 2023-06-06 PROCEDURE — 99213 OFFICE O/P EST LOW 20 MIN: CPT | Performed by: FAMILY MEDICINE

## 2023-06-06 NOTE — PROGRESS NOTES
"Chief Complaint  ADHD (No complains doing well )    3-month follow-up for ADHD    LOV with me in March for chronic med refills and biometric screening, also complains of muscle cramps  -- Routine lab work all WNL, including magnesium.  -- Treated conservatively, increase hydration.  Muscle cramps resolved.  -- No med changes made.    Overall, continues to do well.  Still working full-time.  Maintains a healthy well-balanced diet and active lifestyle.  Weight remains stable.  Mood good.  Sleeping fine.  No chest pain, SOA, or palpitations.    No new complaints or concerns today.  Needs chronic med refills.  Compliant with and tolerates all medications without side effects.  Still taking Vyvanse 60 mg every day for chronic ADHD, even takes on weekends or when not working to be productive with household chores etc.  There was a problem last month with her pharmacy not having the medication, therefore needed to change pharmacies hence delaying medication refill.    Review of Systems   Constitutional:  Negative for unexpected weight change.   Respiratory:  Negative for shortness of breath.    Cardiovascular:  Negative for chest pain.      Subjective          Nely Gonzales presents to [facility]    Objective   Vital Signs:   Vitals:    06/06/23 1055   BP: 110/66   Pulse: 95   Temp: 98.7 °F (37.1 °C)   SpO2: 98%   Weight: 63.5 kg (140 lb)   Height: 154.9 cm (61\")      Body mass index is 26.45 kg/m².   Physical Exam  Vitals and nursing note reviewed.   Constitutional:       Appearance: Normal appearance. She is well-developed.   HENT:      Head: Normocephalic and atraumatic.      Nose: Nose normal.   Eyes:      Conjunctiva/sclera: Conjunctivae normal.      Pupils: Pupils are equal, round, and reactive to light.   Neck:      Thyroid: No thyromegaly.   Cardiovascular:      Rate and Rhythm: Normal rate and regular rhythm.      Heart sounds: Normal heart sounds. No murmur heard.  Pulmonary:      " Effort: Pulmonary effort is normal.      Breath sounds: Normal breath sounds.   Abdominal:      General: Abdomen is flat. Bowel sounds are normal. There is no distension.      Palpations: Abdomen is soft. There is no hepatomegaly, splenomegaly or mass.      Tenderness: There is no abdominal tenderness. There is no guarding or rebound.      Hernia: No hernia is present.   Musculoskeletal:         General: Normal range of motion.      Cervical back: Normal range of motion and neck supple.      Right lower leg: No edema.      Left lower leg: No edema.   Lymphadenopathy:      Cervical: No cervical adenopathy.   Skin:     General: Skin is warm.   Neurological:      General: No focal deficit present.      Mental Status: She is alert.   Psychiatric:         Mood and Affect: Mood normal.         Behavior: Behavior normal.         Thought Content: Thought content normal.         Judgment: Judgment normal.      Result Review :     Common labs          6/24/2022    10:32 8/29/2022    10:14 3/1/2023    09:47   Common Labs   Glucose   103    BUN   15    Creatinine   0.73    Sodium   139    Potassium   4.4    Chloride   102    Calcium   9.3    Total Protein 7.1  7.0     Albumin 4.8  4.6     Total Bilirubin 0.3  0.4     Alkaline Phosphatase 57  56     AST (SGOT) 37  25     ALT (SGPT) 78  31     WBC   6.47    Hemoglobin   13.0    Hematocrit   39.3    Platelets   320    Total Cholesterol   215    Triglycerides   41    HDL Cholesterol   86    LDL Cholesterol    122      TSH          3/1/2023    09:47   TSH   TSH 1.630            Lab Results   Component Value Date    FERRITIN 86 02/15/2022    FOLATE 17.5 02/15/2022               Assessment and Plan    Diagnoses and all orders for this visit:    1. Attention deficit hyperactivity disorder (ADHD), combined type (Primary) --controlled  No change with medication, plan to continue on daily basis.  Urine Tox screen/March 2023 and Gopi report both reviewed, appropriate.  Refill Vyvanse 60  mg 1 p.o. daily, next refill due on 6/13/2023    2. High risk medication use  -     lisdexamfetamine (Vyvanse) 60 MG capsule; Take 1 capsule by mouth Every Morning  Dispense: 30 capsule; Refill: 0        Follow Up   Return in about 3 months (around 9/6/2023) for Recheck, and 6-month wellness.  Patient was given instructions and counseling regarding her condition or for health maintenance advice. Please see specific information pulled into the AVS if appropriate.

## 2023-07-24 DIAGNOSIS — Z79.899 HIGH RISK MEDICATION USE: ICD-10-CM

## 2023-07-24 NOTE — TELEPHONE ENCOUNTER
Rx Refill Note  Requested Prescriptions     Pending Prescriptions Disp Refills    lisdexamfetamine (Vyvanse) 60 MG capsule 30 capsule 0     Sig: Take 1 capsule by mouth Every Morning      Last office visit with prescribing clinician: 6/6/2023   Last telemedicine visit with prescribing clinician: Visit date not found   Next office visit with prescribing clinician: 9/11/2023                         Would you like a call back once the refill request has been completed: [] Yes [] No    If the office needs to give you a call back, can they leave a voicemail: [] Yes [] No    Rocael Varela MA  07/24/23, 07:59 EDT

## 2023-10-23 RX ORDER — ESCITALOPRAM OXALATE 10 MG/1
TABLET ORAL
Qty: 90 TABLET | Refills: 1 | Status: SHIPPED | OUTPATIENT
Start: 2023-10-23